# Patient Record
Sex: FEMALE | Race: WHITE | NOT HISPANIC OR LATINO | Employment: FULL TIME | RURAL
[De-identification: names, ages, dates, MRNs, and addresses within clinical notes are randomized per-mention and may not be internally consistent; named-entity substitution may affect disease eponyms.]

---

## 2018-10-26 ENCOUNTER — CONVERSION ENCOUNTER (OUTPATIENT)
Dept: FAMILY MEDICINE CLINIC | Facility: CLINIC | Age: 38
End: 2018-10-26

## 2019-06-12 VITALS
DIASTOLIC BLOOD PRESSURE: 79 MMHG | OXYGEN SATURATION: 99 % | WEIGHT: 201 LBS | BODY MASS INDEX: 35.61 KG/M2 | SYSTOLIC BLOOD PRESSURE: 113 MMHG | HEIGHT: 63 IN | HEART RATE: 81 BPM | RESPIRATION RATE: 18 BRPM

## 2019-07-01 ENCOUNTER — OFFICE VISIT (OUTPATIENT)
Dept: FAMILY MEDICINE CLINIC | Facility: CLINIC | Age: 39
End: 2019-07-01

## 2019-07-01 VITALS
TEMPERATURE: 97.6 F | RESPIRATION RATE: 18 BRPM | BODY MASS INDEX: 35.97 KG/M2 | OXYGEN SATURATION: 98 % | HEART RATE: 67 BPM | DIASTOLIC BLOOD PRESSURE: 77 MMHG | SYSTOLIC BLOOD PRESSURE: 108 MMHG | HEIGHT: 63 IN | WEIGHT: 203 LBS

## 2019-07-01 DIAGNOSIS — F32.A ANXIETY AND DEPRESSION: ICD-10-CM

## 2019-07-01 DIAGNOSIS — F41.9 ANXIETY AND DEPRESSION: ICD-10-CM

## 2019-07-01 DIAGNOSIS — G43.719 INTRACTABLE CHRONIC MIGRAINE WITHOUT AURA AND WITHOUT STATUS MIGRAINOSUS: ICD-10-CM

## 2019-07-01 DIAGNOSIS — Z79.4 TYPE 2 DIABETES MELLITUS WITHOUT COMPLICATION, WITH LONG-TERM CURRENT USE OF INSULIN (HCC): ICD-10-CM

## 2019-07-01 DIAGNOSIS — Z13.220 SCREENING FOR HYPERLIPIDEMIA: ICD-10-CM

## 2019-07-01 DIAGNOSIS — Z00.00 ANNUAL PHYSICAL EXAM: Primary | ICD-10-CM

## 2019-07-01 DIAGNOSIS — R53.82 CHRONIC FATIGUE: ICD-10-CM

## 2019-07-01 DIAGNOSIS — E03.9 HYPOTHYROIDISM, UNSPECIFIED TYPE: ICD-10-CM

## 2019-07-01 DIAGNOSIS — I10 ESSENTIAL HYPERTENSION: ICD-10-CM

## 2019-07-01 DIAGNOSIS — M25.551 RIGHT HIP PAIN: ICD-10-CM

## 2019-07-01 DIAGNOSIS — E11.9 TYPE 2 DIABETES MELLITUS WITHOUT COMPLICATION, WITH LONG-TERM CURRENT USE OF INSULIN (HCC): ICD-10-CM

## 2019-07-01 PROCEDURE — 99214 OFFICE O/P EST MOD 30 MIN: CPT | Performed by: FAMILY MEDICINE

## 2019-07-01 PROCEDURE — 99395 PREV VISIT EST AGE 18-39: CPT | Performed by: FAMILY MEDICINE

## 2019-07-01 RX ORDER — TRAMADOL HYDROCHLORIDE 50 MG/1
50 TABLET ORAL
COMMUNITY
End: 2020-07-02

## 2019-07-01 RX ORDER — TOPIRAMATE 50 MG/1
50 TABLET, FILM COATED ORAL DAILY
Qty: 30 TABLET | Refills: 1 | Status: SHIPPED | OUTPATIENT
Start: 2019-07-01 | End: 2020-03-19 | Stop reason: SDUPTHER

## 2019-07-01 RX ORDER — LEVOTHYROXINE SODIUM 0.12 MG/1
125 TABLET ORAL DAILY
Qty: 30 TABLET | Refills: 3 | Status: SHIPPED | OUTPATIENT
Start: 2019-07-01 | End: 2020-03-19 | Stop reason: SDUPTHER

## 2019-07-01 RX ORDER — AMITRIPTYLINE HYDROCHLORIDE 25 MG/1
TABLET, FILM COATED ORAL
COMMUNITY
Start: 2016-08-09 | End: 2019-07-01 | Stop reason: SDUPTHER

## 2019-07-01 RX ORDER — TOPIRAMATE 50 MG/1
50 TABLET, FILM COATED ORAL DAILY
COMMUNITY
Start: 2016-02-26 | End: 2019-07-01 | Stop reason: SDUPTHER

## 2019-07-01 RX ORDER — SUMATRIPTAN 100 MG/1
TABLET, FILM COATED ORAL
COMMUNITY
Start: 2016-02-26 | End: 2019-07-01 | Stop reason: SDUPTHER

## 2019-07-01 RX ORDER — LEVOTHYROXINE SODIUM 0.12 MG/1
TABLET ORAL
COMMUNITY
Start: 2016-08-09 | End: 2019-07-01 | Stop reason: SDUPTHER

## 2019-07-01 RX ORDER — FLUOXETINE HYDROCHLORIDE 20 MG/1
20 CAPSULE ORAL DAILY
COMMUNITY
End: 2019-07-01 | Stop reason: SDUPTHER

## 2019-07-01 RX ORDER — SUMATRIPTAN 50 MG/1
50 TABLET, FILM COATED ORAL
Qty: 10 TABLET | Refills: 1 | Status: SHIPPED | OUTPATIENT
Start: 2019-07-01 | End: 2020-03-19 | Stop reason: SDUPTHER

## 2019-07-01 RX ORDER — FLUOXETINE HYDROCHLORIDE 20 MG/1
20 CAPSULE ORAL DAILY
Qty: 30 CAPSULE | Refills: 1 | Status: SHIPPED | OUTPATIENT
Start: 2019-07-01 | End: 2020-03-19 | Stop reason: SDUPTHER

## 2019-07-01 RX ORDER — AMITRIPTYLINE HYDROCHLORIDE 25 MG/1
25 TABLET, FILM COATED ORAL NIGHTLY
Qty: 30 TABLET | Refills: 3 | Status: SHIPPED | OUTPATIENT
Start: 2019-07-01 | End: 2020-07-02

## 2019-07-01 RX ORDER — GABAPENTIN 300 MG/1
300 CAPSULE ORAL DAILY
Qty: 30 CAPSULE | Refills: 1 | Status: SHIPPED | OUTPATIENT
Start: 2019-07-01 | End: 2019-07-31

## 2019-07-02 LAB
25(OH)D3+25(OH)D2 SERPL-MCNC: 18 NG/ML (ref 30–100)
ALBUMIN SERPL-MCNC: 3.8 G/DL (ref 3.5–5.5)
ALBUMIN/GLOB SERPL: 1.3 {RATIO} (ref 1.2–2.2)
ALP SERPL-CCNC: 85 IU/L (ref 39–117)
ALT SERPL-CCNC: 10 IU/L (ref 0–32)
AST SERPL-CCNC: 10 IU/L (ref 0–40)
BASOPHILS # BLD AUTO: 0 X10E3/UL (ref 0–0.2)
BASOPHILS NFR BLD AUTO: 0 %
BILIRUB SERPL-MCNC: 0.3 MG/DL (ref 0–1.2)
BUN SERPL-MCNC: 18 MG/DL (ref 6–20)
BUN/CREAT SERPL: 29 (ref 9–23)
CALCIUM SERPL-MCNC: 9.2 MG/DL (ref 8.7–10.2)
CHLORIDE SERPL-SCNC: 103 MMOL/L (ref 96–106)
CHOLEST SERPL-MCNC: 214 MG/DL (ref 100–199)
CHOLEST/HDLC SERPL: 5.1 RATIO (ref 0–4.4)
CO2 SERPL-SCNC: 26 MMOL/L (ref 20–29)
CREAT SERPL-MCNC: 0.63 MG/DL (ref 0.57–1)
EOSINOPHIL # BLD AUTO: 0.3 X10E3/UL (ref 0–0.4)
EOSINOPHIL NFR BLD AUTO: 3 %
ERYTHROCYTE [DISTWIDTH] IN BLOOD BY AUTOMATED COUNT: 13.1 % (ref 12.3–15.4)
GLOBULIN SER CALC-MCNC: 3 G/DL (ref 1.5–4.5)
GLUCOSE SERPL-MCNC: 87 MG/DL (ref 65–99)
HBA1C MFR BLD: 6 % (ref 4.8–5.6)
HCT VFR BLD AUTO: 38.8 % (ref 34–46.6)
HDLC SERPL-MCNC: 42 MG/DL
HGB BLD-MCNC: 12.6 G/DL (ref 11.1–15.9)
IMM GRANULOCYTES # BLD AUTO: 0 X10E3/UL (ref 0–0.1)
IMM GRANULOCYTES NFR BLD AUTO: 0 %
LDLC SERPL CALC-MCNC: 141 MG/DL (ref 0–99)
LYMPHOCYTES # BLD AUTO: 2.6 X10E3/UL (ref 0.7–3.1)
LYMPHOCYTES NFR BLD AUTO: 25 %
MCH RBC QN AUTO: 28.8 PG (ref 26.6–33)
MCHC RBC AUTO-ENTMCNC: 32.5 G/DL (ref 31.5–35.7)
MCV RBC AUTO: 89 FL (ref 79–97)
MONOCYTES # BLD AUTO: 0.4 X10E3/UL (ref 0.1–0.9)
MONOCYTES NFR BLD AUTO: 4 %
NEUTROPHILS # BLD AUTO: 7 X10E3/UL (ref 1.4–7)
NEUTROPHILS NFR BLD AUTO: 68 %
PLATELET # BLD AUTO: 304 X10E3/UL (ref 150–450)
POTASSIUM SERPL-SCNC: 4.3 MMOL/L (ref 3.5–5.2)
PROT SERPL-MCNC: 6.8 G/DL (ref 6–8.5)
RBC # BLD AUTO: 4.38 X10E6/UL (ref 3.77–5.28)
SODIUM SERPL-SCNC: 140 MMOL/L (ref 134–144)
T3 SERPL-MCNC: 108 NG/DL (ref 71–180)
T3FREE SERPL-MCNC: 2.7 PG/ML (ref 2–4.4)
T4 FREE SERPL-MCNC: 0.61 NG/DL (ref 0.82–1.77)
T4 SERPL-MCNC: 4.6 UG/DL (ref 4.5–12)
TRIGL SERPL-MCNC: 157 MG/DL (ref 0–149)
TSH SERPL DL<=0.005 MIU/L-ACNC: 9.84 UIU/ML (ref 0.45–4.5)
VIT B12 SERPL-MCNC: 583 PG/ML (ref 232–1245)
VLDLC SERPL CALC-MCNC: 31 MG/DL (ref 5–40)
WBC # BLD AUTO: 10.4 X10E3/UL (ref 3.4–10.8)

## 2019-07-12 PROBLEM — G43.009 MIGRAINE WITHOUT AURA AND WITHOUT STATUS MIGRAINOSUS, NOT INTRACTABLE: Status: ACTIVE | Noted: 2019-07-12

## 2019-07-12 PROBLEM — R53.83 MALAISE AND FATIGUE: Status: ACTIVE | Noted: 2019-07-12

## 2019-07-12 PROBLEM — D64.9 ANEMIA: Status: ACTIVE | Noted: 2019-07-12

## 2019-07-12 PROBLEM — M25.561 KNEE PAIN, RIGHT: Status: ACTIVE | Noted: 2019-07-12

## 2019-07-12 PROBLEM — E11.9 DIABETES (HCC): Status: ACTIVE | Noted: 2019-07-12

## 2019-07-12 PROBLEM — E03.9 HYPOTHYROID: Status: ACTIVE | Noted: 2019-07-12

## 2019-07-12 PROBLEM — R53.81 MALAISE AND FATIGUE: Status: ACTIVE | Noted: 2019-07-12

## 2019-08-10 PROBLEM — M25.551 RIGHT HIP PAIN: Status: ACTIVE | Noted: 2019-08-10

## 2019-08-10 PROBLEM — I10 ESSENTIAL HYPERTENSION: Status: ACTIVE | Noted: 2019-08-10

## 2019-08-10 PROBLEM — Z00.00 ANNUAL PHYSICAL EXAM: Status: ACTIVE | Noted: 2019-08-10

## 2019-08-11 NOTE — ASSESSMENT & PLAN NOTE
Discussed injury prevention, diet and exercise, safe sexual practices, and screening for common diseases. Encouraged use of sunscreen and seatbelts. Avoidance of tobacco encouraged. Limitation or avoidance of alcohol encouraged. Recommend yearly dental and eye exams. Also discussed monitoring of blood pressure, lipids.

## 2020-03-19 ENCOUNTER — OFFICE VISIT (OUTPATIENT)
Dept: FAMILY MEDICINE CLINIC | Facility: CLINIC | Age: 40
End: 2020-03-19

## 2020-03-19 VITALS
BODY MASS INDEX: 35.93 KG/M2 | SYSTOLIC BLOOD PRESSURE: 100 MMHG | HEIGHT: 63 IN | DIASTOLIC BLOOD PRESSURE: 66 MMHG | TEMPERATURE: 98.4 F | WEIGHT: 202.8 LBS | OXYGEN SATURATION: 95 % | HEART RATE: 118 BPM

## 2020-03-19 DIAGNOSIS — Z79.4 TYPE 2 DIABETES MELLITUS WITHOUT COMPLICATION, WITH LONG-TERM CURRENT USE OF INSULIN (HCC): ICD-10-CM

## 2020-03-19 DIAGNOSIS — E03.9 HYPOTHYROIDISM, UNSPECIFIED TYPE: ICD-10-CM

## 2020-03-19 DIAGNOSIS — R10.11 RIGHT UPPER QUADRANT PAIN: Primary | ICD-10-CM

## 2020-03-19 DIAGNOSIS — F32.A ANXIETY AND DEPRESSION: ICD-10-CM

## 2020-03-19 DIAGNOSIS — F41.9 ANXIETY AND DEPRESSION: ICD-10-CM

## 2020-03-19 DIAGNOSIS — G43.719 INTRACTABLE CHRONIC MIGRAINE WITHOUT AURA AND WITHOUT STATUS MIGRAINOSUS: ICD-10-CM

## 2020-03-19 DIAGNOSIS — E11.9 TYPE 2 DIABETES MELLITUS WITHOUT COMPLICATION, WITH LONG-TERM CURRENT USE OF INSULIN (HCC): ICD-10-CM

## 2020-03-19 PROCEDURE — 99213 OFFICE O/P EST LOW 20 MIN: CPT | Performed by: FAMILY MEDICINE

## 2020-03-19 RX ORDER — SUMATRIPTAN 5 MG/1
SPRAY NASAL
COMMUNITY
End: 2020-03-19 | Stop reason: SDUPTHER

## 2020-03-19 RX ORDER — DICYCLOMINE HCL 20 MG
20 TABLET ORAL EVERY 6 HOURS
Qty: 60 TABLET | Refills: 0 | Status: SHIPPED | OUTPATIENT
Start: 2020-03-19

## 2020-03-19 RX ORDER — ONDANSETRON 4 MG/1
4 TABLET, FILM COATED ORAL EVERY 8 HOURS PRN
Qty: 30 TABLET | Refills: 0 | Status: SHIPPED | OUTPATIENT
Start: 2020-03-19 | End: 2021-03-18 | Stop reason: SDUPTHER

## 2020-03-19 RX ORDER — FLUOXETINE HYDROCHLORIDE 20 MG/1
20 CAPSULE ORAL DAILY
Qty: 30 CAPSULE | Refills: 1 | Status: SHIPPED | OUTPATIENT
Start: 2020-03-19 | End: 2020-06-05 | Stop reason: ALTCHOICE

## 2020-03-19 RX ORDER — TOPIRAMATE 50 MG/1
50 TABLET, FILM COATED ORAL DAILY
Qty: 30 TABLET | Refills: 1 | Status: SHIPPED | OUTPATIENT
Start: 2020-03-19 | End: 2020-07-23

## 2020-03-19 RX ORDER — ONDANSETRON 4 MG/1
TABLET, ORALLY DISINTEGRATING ORAL
COMMUNITY
Start: 2020-03-11 | End: 2020-07-02

## 2020-03-19 RX ORDER — LEVOTHYROXINE SODIUM 0.12 MG/1
125 TABLET ORAL DAILY
Qty: 30 TABLET | Refills: 3 | Status: SHIPPED | OUTPATIENT
Start: 2020-03-19 | End: 2020-07-23

## 2020-03-19 RX ORDER — CHLORCYCLIZINE HYDROCHLORIDE AND PSEUDOEPHEDRINE HYDROCHLORIDE 25; 60 MG/1; MG/1
TABLET ORAL
COMMUNITY
Start: 2020-03-11 | End: 2020-06-01

## 2020-03-19 RX ORDER — SUMATRIPTAN 50 MG/1
50 TABLET, FILM COATED ORAL
Qty: 10 TABLET | Refills: 1 | Status: SHIPPED | OUTPATIENT
Start: 2020-03-19 | End: 2020-10-22 | Stop reason: SDUPTHER

## 2020-03-19 NOTE — ASSESSMENT & PLAN NOTE
Based on exam and positive Marley's test, patient likely has cholecystitis.  Due to the recent coronavirus pandemic all elective surgeries have been halted at this time.  Once the pandemic is over she was referred to general surgery for further evaluation and treatment.

## 2020-03-19 NOTE — PROGRESS NOTES
Subjective  Answers for HPI/ROS submitted by the patient on 3/16/2020   What is the primary reason for your visit?: Other  Please describe your symptoms.: Nausea, stomach cramps at the top of the belly near the breast bone. On and off again Fever.and belching.  Have you had these symptoms before?: No  How long have you been having these symptoms?: 1-4 weeks ago  Please list any medications you are currently taking for this condition.: some over the counter chewable antacids. Nausea medication    Roseanna Charles is a 39 y.o. female.     Chief Complaint   Patient presents with   • Heartburn   • Vomiting   • Nausea       Heartburn   She complains of belching, heartburn and nausea. She reports no coughing. This is a new (heartburn that started 2 weeks ago does not happen all the time but does happen with gressy foods patient stats she gets pain in the center of her chest that occasionlly travles up to her throut ) problem. The current episode started 1 to 4 weeks ago. The problem occurs frequently. The problem has been gradually worsening. The heartburn duration is several minutes (30). The heartburn is located in the substernum. The heartburn is of severe intensity. The heartburn wakes her from sleep. The heartburn limits her activity. The heartburn doesn't change with position. The symptoms are aggravated by certain foods and stress (patient is under alot of stress right now due to family issues ). Pertinent negatives include no fatigue or muscle weakness. There are no known risk factors. She has tried an antacid (patient does not recall the name if the medication she tried it was a chewable tablet that helpped only brifly ) for the symptoms. The treatment provided no relief.   Vomiting    This is a new problem. The current episode started in the past 7 days (monday and tuesday of this week patient was throwing up for 20 min episodes ). The problem occurs 5 to 10 times per day. The problem has been gradually  improving. The emesis has an appearance of stomach contents. There has been no fever. Associated symptoms include headaches. Pertinent negatives include no coughing, dizziness or fever. She has tried increased fluids for the symptoms. The treatment provided moderate relief.   Nausea   This is a new (patient has not been wanting to eat much due to her feeling nauses patient has not taken any thing for the nausea but stats she feeling like it is getting better ) problem. The current episode started in the past 7 days. The problem occurs daily. The problem has been unchanged. Associated symptoms include headaches, nausea and vomiting. Pertinent negatives include no coughing, fatigue or fever. Nothing aggravates the symptoms. She has tried lying down for the symptoms. The treatment provided no relief.   Fever    This is a new problem. The current episode started in the past 7 days (fever started a week ago today and ended tuesday of this week ). The problem occurs 2 to 4 times per day. The problem has been resolved. The maximum temperature noted was 99 to 99.9 F. The temperature was taken using an oral thermometer. Associated symptoms include headaches, nausea and vomiting. Pertinent negatives include no coughing.        The following portions of the patient's history were reviewed and updated as appropriate: allergies, current medications, past family history, past medical history, past social history, past surgical history and problem list.    Allergies:  No Known Allergies    Social History:  Social History     Socioeconomic History   • Marital status:      Spouse name: Not on file   • Number of children: Not on file   • Years of education: Not on file   • Highest education level: Not on file   Tobacco Use   • Smoking status: Never Smoker   • Smokeless tobacco: Never Used   Substance and Sexual Activity   • Alcohol use: Yes     Comment: Occasional   • Drug use: No   • Sexual activity: Not Currently      Partners: Female     Comment:        Family History:  Family History   Problem Relation Age of Onset   • Hyperlipidemia Mother    • Diabetes Mother         Diabetes Mellitus   • Coronary artery disease Father    • Hypertension Father    • Diabetes Father         Diabetes Mellitus   • COPD Son    • Coronary artery disease Paternal Grandmother    • Hypertension Paternal Grandmother    • Diabetes Paternal Grandmother         Diabetes Mellitus   • COPD Paternal Grandmother        Past Medical History :  Patient Active Problem List   Diagnosis   • Anxiety and depression   • Migraine   • Obstructive sleep apnea   • Anemia   • Diabetes (CMS/HCC)   • Hypothyroid   • Knee pain, right   • Malaise and fatigue   • Migraine without aura and without status migrainosus, not intractable   • Annual physical exam   • Essential hypertension   • Right hip pain   • Right upper quadrant pain       Medication List:  Outpatient Encounter Medications as of 3/19/2020   Medication Sig Dispense Refill   • amitriptyline (ELAVIL) 25 MG tablet Take 1 tablet by mouth Every Night. 30 tablet 3   • diclofenac (VOLTAREN) 50 MG EC tablet Take 1 tablet by mouth 2 (Two) Times a Day As Needed (pain). 60 tablet 1   • FLUoxetine (PROzac) 20 MG capsule Take 1 capsule by mouth Daily. 30 capsule 1   • levothyroxine (SYNTHROID, LEVOTHROID) 125 MCG tablet Take 1 tablet by mouth Daily. 30 tablet 3   • metFORMIN (GLUCOPHAGE) 500 MG tablet Take 1 tablet by mouth 2 (Two) Times a Day With Meals. 60 tablet 3   • ondansetron ODT (ZOFRAN-ODT) 4 MG disintegrating tablet DISSOLVE 1 TABLET IN MOUTH EVERY 6 HOURS AS NEEDED FOR NAUSEA     • STAHIST AD 25-60 MG tablet TAKE 1 TABLET BY MOUTH EVERY 8 HOURS AS NEEDED FOR CONGESTION FOR 30 DAYS     • SUMAtriptan (IMITREX) 50 MG tablet Take 1 tablet by mouth Every 2 (Two) Hours As Needed for Migraine (Max is 200mg a day.). 1 tab at onset of headache. Repeat 1time in 2 hrs 10 tablet 1   • topiramate (Topamax) 50 MG tablet  "Take 1 tablet by mouth Daily. 30 tablet 1   • traMADol (ULTRAM) 50 MG tablet Take 50 mg by mouth.     • [DISCONTINUED] FLUoxetine (PROzac) 20 MG capsule Take 1 capsule by mouth Daily. 30 capsule 1   • [DISCONTINUED] levothyroxine (SYNTHROID, LEVOTHROID) 125 MCG tablet Take 1 tablet by mouth Daily. 30 tablet 3   • [DISCONTINUED] metFORMIN (GLUCOPHAGE) 500 MG tablet Take 1 tablet by mouth 2 (Two) Times a Day With Meals. 60 tablet 3   • [DISCONTINUED] SUMAtriptan (IMITREX) 50 MG tablet Take 1 tablet by mouth Every 2 (Two) Hours As Needed for Migraine (Max is 200mg a day.). 1 tab at onset of headache. Repeat 1time in 2 hrs 10 tablet 1   • [DISCONTINUED] topiramate (TOPAMAX) 50 MG tablet Take 1 tablet by mouth Daily. 30 tablet 1   • dicyclomine (Bentyl) 20 MG tablet Take 1 tablet by mouth Every 6 (Six) Hours. 60 tablet 0   • ondansetron (ZOFRAN) 4 MG tablet Take 1 tablet by mouth Every 8 (Eight) Hours As Needed for Nausea or Vomiting. 30 tablet 0   • [DISCONTINUED] SUMAtriptan (IMITREX) 5 MG/ACT nasal spray        No facility-administered encounter medications on file as of 3/19/2020.        Past Surgical History:  Past Surgical History:   Procedure Laterality Date   •  SECTION      x2   • CYSTECTOMY      total       Review of Systems:  Review of Systems   Constitutional: Negative for fatigue and fever.   Respiratory: Negative for cough.    Gastrointestinal: Positive for nausea and vomiting.   Musculoskeletal: Negative for muscle weakness.   Neurological: Negative for dizziness.       I have reviewed and confirmed the accuracy of the ROS as documented by the MA/LPN/RN Doug Recinos Sr, MD    Vital Signs:  Visit Vitals  /66   Pulse 118   Temp 98.4 °F (36.9 °C) (Oral)   Ht 160 cm (63\")   Wt 92 kg (202 lb 12.8 oz)   SpO2 95%   BMI 35.92 kg/m²       Physical Exam   Constitutional: She is oriented to person, place, and time. She appears well-developed and well-nourished.   HENT:   Head: Normocephalic.   Right " Ear: External ear normal.   Left Ear: External ear normal.   Nose: Nose normal.   Eyes: Conjunctivae are normal.   Neck: Normal range of motion. Neck supple.   Cardiovascular: Normal rate and regular rhythm.   Pulmonary/Chest: Effort normal and breath sounds normal.   Abdominal: There is tenderness in the right upper quadrant. There is positive Polanco's sign.   Musculoskeletal: Normal range of motion.   Neurological: She is alert and oriented to person, place, and time.   Skin: Skin is warm and dry. Capillary refill takes less than 2 seconds.   Vitals reviewed.      Assessment and Plan:  Diagnoses and all orders for this visit:    1. Right upper quadrant pain (Primary)  Assessment & Plan:  Based on exam and positive Marley's test, patient likely has cholecystitis.  Due to the recent coronavirus pandemic all elective surgeries have been halted at this time.  Once the pandemic is over she was referred to general surgery for further evaluation and treatment.    Orders:  -     ondansetron (ZOFRAN) 4 MG tablet; Take 1 tablet by mouth Every 8 (Eight) Hours As Needed for Nausea or Vomiting.  Dispense: 30 tablet; Refill: 0  -     dicyclomine (Bentyl) 20 MG tablet; Take 1 tablet by mouth Every 6 (Six) Hours.  Dispense: 60 tablet; Refill: 0    2. Type 2 diabetes mellitus without complication, with long-term current use of insulin (CMS/[facility])  -     metFORMIN (GLUCOPHAGE) 500 MG tablet; Take 1 tablet by mouth 2 (Two) Times a Day With Meals.  Dispense: 60 tablet; Refill: 3    3. Intractable chronic migraine without aura and without status migrainosus  -     SUMAtriptan (IMITREX) 50 MG tablet; Take 1 tablet by mouth Every 2 (Two) Hours As Needed for Migraine (Max is 200mg a day.). 1 tab at onset of headache. Repeat 1time in 2 hrs  Dispense: 10 tablet; Refill: 1  -     topiramate (Topamax) 50 MG tablet; Take 1 tablet by mouth Daily.  Dispense: 30 tablet; Refill: 1    4. Anxiety and depression  -     FLUoxetine (PROzac) 20 MG  capsule; Take 1 capsule by mouth Daily.  Dispense: 30 capsule; Refill: 1    5. Hypothyroidism, unspecified type  -     levothyroxine (SYNTHROID, LEVOTHROID) 125 MCG tablet; Take 1 tablet by mouth Daily.  Dispense: 30 tablet; Refill: 3        An After Visit Summary and PPPS were given to the patient.

## 2020-05-06 ENCOUNTER — NURSE TRIAGE (OUTPATIENT)
Dept: CALL CENTER | Facility: HOSPITAL | Age: 40
End: 2020-05-06

## 2020-05-06 NOTE — TELEPHONE ENCOUNTER
She had Evisit and answered questions at [facility] Dr. Recinos office and was told to be tested for COVID 19,at [facility]  She was wondering what to do. Triage nurse gave her the number for [facility]gent [facility]  [address], [phone] to call and see what she needs to do. She needs work excuse to please through [employer].     Reason for Disposition  • 1] COVID-19 infection diagnosed or suspected AND [2] mild symptoms (fever, cough) AND [2] no trouble breathing or other complications    Additional Information  • Negative: SEVERE difficulty breathing (e.g., struggling for each breath, speaks in single words)  • Negative: Difficult to awaken or acting confused (e.g., disoriented, slurred speech)  • Negative: Bluish (or gray) lips or face now  • Negative: Shock suspected (e.g., cold/pale/clammy skin, too weak to stand, low BP, rapid pulse)  • Negative: Sounds like a life-threatening emergency to the triager  • Negative: [1] COVID-19 suspected (e.g., cough, fever, shortness of breath) AND [2] public health department recommends testing  • Negative: [1] COVID-19 exposure AND [2] no symptoms  • Negative: COVID-19 and Breastfeeding, questions about  • Negative: SEVERE or constant chest pain (Exception: mild central chest pain, present only when coughing)  • Negative: MODERATE difficulty breathing (e.g., speaks in phrases, SOB even at rest, pulse 100-120)  • Negative: Patient sounds very sick or weak to the triager  • Negative: MILD difficulty breathing (e.g., minimal/no SOB at rest, SOB with walking, pulse <100)  • Negative: Chest pain  • Negative: Fever > 103 F (39.4 C)  • Negative: [1] Fever > 101 F (38.3 C) AND [2] age > 60  • Negative: [1] Fever > 100.0 F (37.8 C) AND [2] bedridden (e.g., nursing home patient, CVA, chronic illness, recovering from surgery)  • Negative: HIGH RISK patient (e.g., age > 64 years, diabetes, heart or lung disease, weak immune system)  • Negative: Fever present > 3 days  "(72 hours)  • Negative: [1] Fever returns after gone for over 24 hours AND [2] symptoms worse or not improved  • Negative: [1] Continuous (nonstop) coughing interferes with work or school AND [2] no improvement using cough treatment per protocol  • Negative: Cough present > 3 weeks  • Negative: COVID-19, questions about    Answer Assessment - Initial Assessment Questions  1. COVID-19 DIAGNOSIS: \"Who made your Coronavirus (COVID-19) diagnosis?\" \"Was it confirmed by a positive lab test?\" If not diagnosed by a HCP, ask \"Are there lots of cases (community spread) where you live?\" (See public health department website, if unsure)    * MAJOR community spread: high number of cases; numbers of cases are increasing; many people hospitalized.    * MINOR community spread: low number of cases; not increasing; few or no people hospitalized      minor  2. ONSET: \"When did the COVID-19 symptoms start?\"       3 days  3. WORST SYMPTOM: \"What is your worst symptom?\" (e.g., cough, fever, shortness of breath, muscle aches)      Headache and aching and feeling bad  4. COUGH: \"How bad is the cough?\"        Cough comes and goes  5. FEVER: \"Do you have a fever?\" If so, ask: \"What is your temperature, how was it measured, and when did it start?\"      101  6. RESPIRATORY STATUS: \"Describe your breathing?\" (e.g., shortness of breath, wheezing, unable to speak)       Sob at times, not comfortable to breathe, no wheezing  7. BETTER-SAME-WORSE: \"Are you getting better, staying the same or getting worse compared to yesterday?\"  If getting worse, ask, \"In what way?\"      worse today  8. HIGH RISK DISEASE: \"Do you have any chronic medical problems?\" (e.g., asthma, heart or lung disease, weak immune system, etc.)      Diabetic, thyroid disease  9. PREGNANCY: \"Is there any chance you are pregnant?\" \"When was your last menstrual period?\"      no  10. OTHER SYMPTOMS: \"Do you have any other symptoms?\"  (e.g., runny nose, headache, sore throat, loss of " smell)        Headache, sob, throat scratchy    Protocols used: CORONAVIRUS (COVID-19) DIAGNOSED OR SUSPECTED-ADULT-

## 2020-05-07 ENCOUNTER — TELEPHONE (OUTPATIENT)
Dept: FAMILY MEDICINE CLINIC | Facility: CLINIC | Age: 40
End: 2020-05-07

## 2020-05-08 PROCEDURE — U0003 INFECTIOUS AGENT DETECTION BY NUCLEIC ACID (DNA OR RNA); SEVERE ACUTE RESPIRATORY SYNDROME CORONAVIRUS 2 (SARS-COV-2) (CORONAVIRUS DISEASE [COVID-19]), AMPLIFIED PROBE TECHNIQUE, MAKING USE OF HIGH THROUGHPUT TECHNOLOGIES AS DESCRIBED BY CMS-2020-01-R: HCPCS | Performed by: NURSE PRACTITIONER

## 2020-05-13 ENCOUNTER — TELEPHONE (OUTPATIENT)
Dept: URGENT CARE | Facility: CLINIC | Age: 40
End: 2020-05-13

## 2020-06-01 ENCOUNTER — TELEMEDICINE (OUTPATIENT)
Dept: FAMILY MEDICINE CLINIC | Facility: CLINIC | Age: 40
End: 2020-06-01

## 2020-06-01 DIAGNOSIS — J98.8 BACTERIAL RESPIRATORY INFECTION: Primary | ICD-10-CM

## 2020-06-01 DIAGNOSIS — B96.89 BACTERIAL RESPIRATORY INFECTION: Primary | ICD-10-CM

## 2020-06-01 PROCEDURE — 99213 OFFICE O/P EST LOW 20 MIN: CPT | Performed by: FAMILY MEDICINE

## 2020-06-01 RX ORDER — CIPROFLOXACIN 500 MG/1
500 TABLET, FILM COATED ORAL 2 TIMES DAILY
Qty: 20 TABLET | Refills: 0 | Status: SHIPPED | OUTPATIENT
Start: 2020-06-01 | End: 2020-06-11

## 2020-06-01 RX ORDER — PREDNISONE 20 MG/1
40 TABLET ORAL DAILY
Qty: 10 TABLET | Refills: 0 | Status: SHIPPED | OUTPATIENT
Start: 2020-06-01 | End: 2020-06-06

## 2020-06-01 RX ORDER — GUAIFENESIN AND CODEINE PHOSPHATE 100; 10 MG/5ML; MG/5ML
5 SOLUTION ORAL 3 TIMES DAILY PRN
Qty: 180 ML | Refills: 0 | Status: SHIPPED | OUTPATIENT
Start: 2020-06-01 | End: 2020-06-11

## 2020-06-01 NOTE — PROGRESS NOTES
Chief Complaint   Patient presents with   • URI     COVID 19 symptoms      Video Visit    History of Present Illness:  Subjective   Roseanna Charles is a 39 y.o. female.   URI    This is a new problem. The current episode started in the past 7 days (started 3 days ago ). The problem has been rapidly worsening. The maximum temperature recorded prior to her arrival was 101 - 101.9 F (She has 101.3 to low 100.0). Associated symptoms include abdominal pain, congestion, coughing, diarrhea and headaches. Pertinent negatives include no chest pain, dysuria, ear pain, joint pain, joint swelling, nausea, neck pain, plugged ear sensation, rash, rhinorrhea, sinus pain, sneezing, sore throat, swollen glands, vomiting or wheezing. Associated symptoms comments: Patient states that she has been feeling really off and not well for the last 3 days or so. She states that she has been having fevers,SOB, Cough, Fatigued and hurting all over. She states that she has not been exposed to her knowledge to someone with COVID-19. She states that she has been staying at home. She state that she has been trying to stay away from people. She states that she just is not feeling well. . She has tried nothing for the symptoms. The treatment provided no relief.        Allergies:  No Known Allergies    Social History:  Social History     Socioeconomic History   • Marital status:      Spouse name: Not on file   • Number of children: Not on file   • Years of education: Not on file   • Highest education level: Not on file   Tobacco Use   • Smoking status: Never Smoker   • Smokeless tobacco: Never Used   Substance and Sexual Activity   • Alcohol use: Yes     Comment: Occasional   • Drug use: No   • Sexual activity: Not Currently     Partners: Female     Comment:        Family History:  Family History   Problem Relation Age of Onset   • Hyperlipidemia Mother    • Diabetes Mother         Diabetes Mellitus   • Coronary artery disease Father     • Hypertension Father    • Diabetes Father         Diabetes Mellitus   • COPD Son    • Coronary artery disease Paternal Grandmother    • Hypertension Paternal Grandmother    • Diabetes Paternal Grandmother         Diabetes Mellitus   • COPD Paternal Grandmother        Past Medical History :  Active Ambulatory Problems     Diagnosis Date Noted   • Anxiety and depression 07/01/2019   • Migraine 08/09/2016   • Obstructive sleep apnea 08/09/2016   • Anemia 07/12/2019   • Diabetes (CMS/HCC) 07/12/2019   • Hypothyroid 07/12/2019   • Knee pain, right 07/12/2019   • Malaise and fatigue 07/12/2019   • Migraine without aura and without status migrainosus, not intractable 07/12/2019   • Annual physical exam 08/10/2019   • Essential hypertension 08/10/2019   • Right hip pain 08/10/2019   • Right upper quadrant pain 03/19/2020   • Bacterial respiratory infection 06/01/2020     Resolved Ambulatory Problems     Diagnosis Date Noted   • No Resolved Ambulatory Problems     Past Medical History:   Diagnosis Date   • Abdominal pain    • Acute non-recurrent maxillary sinusitis    • Bacterial vaginosis    • Chicken pox    • Flu-like symptoms    • History of ulcer disease    • Overweight (BMI 25.0-29.9)    • Upper respiratory infection, acute        Medication List:  Outpatient Encounter Medications as of 6/1/2020   Medication Sig Dispense Refill   • amitriptyline (ELAVIL) 25 MG tablet Take 1 tablet by mouth Every Night. 30 tablet 3   • diclofenac (VOLTAREN) 50 MG EC tablet Take 1 tablet by mouth 2 (Two) Times a Day As Needed (pain). 60 tablet 1   • dicyclomine (Bentyl) 20 MG tablet Take 1 tablet by mouth Every 6 (Six) Hours. 60 tablet 0   • FLUoxetine (PROzac) 20 MG capsule Take 1 capsule by mouth Daily. 30 capsule 1   • levothyroxine (SYNTHROID, LEVOTHROID) 125 MCG tablet Take 1 tablet by mouth Daily. 30 tablet 3   • metFORMIN (GLUCOPHAGE) 500 MG tablet Take 1 tablet by mouth 2 (Two) Times a Day With Meals. 60 tablet 3   •  ondansetron (ZOFRAN) 4 MG tablet Take 1 tablet by mouth Every 8 (Eight) Hours As Needed for Nausea or Vomiting. 30 tablet 0   • ondansetron ODT (ZOFRAN-ODT) 4 MG disintegrating tablet DISSOLVE 1 TABLET IN MOUTH EVERY 6 HOURS AS NEEDED FOR NAUSEA     • SUMAtriptan (IMITREX) 50 MG tablet Take 1 tablet by mouth Every 2 (Two) Hours As Needed for Migraine (Max is 200mg a day.). 1 tab at onset of headache. Repeat 1time in 2 hrs 10 tablet 1   • topiramate (Topamax) 50 MG tablet Take 1 tablet by mouth Daily. 30 tablet 1   • traMADol (ULTRAM) 50 MG tablet Take 50 mg by mouth.     • [DISCONTINUED] STAHIST AD 25-60 MG tablet TAKE 1 TABLET BY MOUTH EVERY 8 HOURS AS NEEDED FOR CONGESTION FOR 30 DAYS     • ciprofloxacin (CIPRO) 500 MG tablet Take 1 tablet by mouth 2 (Two) Times a Day for 10 days. 20 tablet 0   • guaiFENesin-codeine (GUAIFENESIN AC) 100-10 MG/5ML liquid Take 5 mL by mouth 3 (Three) Times a Day As Needed for Cough for up to 10 days. 180 mL 0   • predniSONE (DELTASONE) 20 MG tablet Take 2 tablets by mouth Daily for 5 days. 10 tablet 0     No facility-administered encounter medications on file as of 2020.        Past Surgical History:  Past Surgical History:   Procedure Laterality Date   •  SECTION      x2   • CYSTECTOMY      total   • LASER ABLATION          The following portions of the patient's history were reviewed and updated as appropriate: allergies, current medications, past family history, past medical history, past social history, past surgical history and problem list.    Review Of Systems:  Review of Systems   Constitutional: Negative for activity change, appetite change and fatigue.   HENT: Positive for congestion. Negative for ear discharge, ear pain, postnasal drip, rhinorrhea, sneezing, sore throat and swollen glands.    Eyes: Negative for double vision and discharge.   Respiratory: Positive for cough. Negative for chest tightness, shortness of breath and wheezing.    Cardiovascular:  Negative for chest pain.   Gastrointestinal: Positive for abdominal pain and diarrhea. Negative for nausea and vomiting.   Endocrine: Negative for cold intolerance and heat intolerance.   Genitourinary: Negative for dysuria.   Musculoskeletal: Negative for back pain, gait problem, joint pain, joint swelling and neck pain.   Skin: Negative for color change and rash.   Neurological: Negative for dizziness, facial asymmetry and confusion.   Psychiatric/Behavioral: Negative for behavioral problems.       Objective     Physical Exam:  Vital Signs:  There were no vitals taken for this visit.    Physical Exam    Assessment/Plan   Assessment and Plan:  Diagnoses and all orders for this visit:    1. Bacterial respiratory infection (Primary)  Assessment & Plan:  He was prescribed Augmentin and Cheratussin to treat his symptoms.    Increase fluids. Tylenol/motrin for pain or fever.   Medication and medication adverse effects discussed.    Follow-up 5-7 days for reevaluation if not improved or sooner if needed.  This respiratory infection could possibly be a influenza but it is outside the window of treatment for the flu.    Orders:  -     ciprofloxacin (CIPRO) 500 MG tablet; Take 1 tablet by mouth 2 (Two) Times a Day for 10 days.  Dispense: 20 tablet; Refill: 0  -     predniSONE (DELTASONE) 20 MG tablet; Take 2 tablets by mouth Daily for 5 days.  Dispense: 10 tablet; Refill: 0  -     guaiFENesin-codeine (GUAIFENESIN AC) 100-10 MG/5ML liquid; Take 5 mL by mouth 3 (Three) Times a Day As Needed for Cough for up to 10 days.  Dispense: 180 mL; Refill: 0  The use of a video visit has been reviewed with the patient and verbal informed consent has been obtained.         Spent 10 minutes with patient and greater than 50% of visit spent on counseling and coordination of care regarding the patient's illness, along with the pros and cons of various treatment options.

## 2020-06-02 ENCOUNTER — PATIENT MESSAGE (OUTPATIENT)
Dept: FAMILY MEDICINE CLINIC | Facility: CLINIC | Age: 40
End: 2020-06-02

## 2020-06-03 NOTE — TELEPHONE ENCOUNTER
From: Roseanna Charles  Sent: 6/3/2020 3:38 PM EDT  To: Silvana Ernandez LPN  Subject: RE: Visit Follow-Up Question    I am sorry they never gave me a direct phone number to my  or a fax number. All I can do is upload stuff.    ----- Message -----  From: RIVKA TREJO  Sent: 6/3/20 2:37 PM  To: Roseanna Charles  Subject: RE: Visit Follow-Up Question    So I just need to send a work note to them stating you will be back on 6/14/20?     I need a fax number or a phone number so I can call and get the fax number.     -Silvana    ----- Message -----   From: Roseanna Charles   Sent: 6/3/2020 2:30 PM EDT   To: RIVKA TREJO  Subject: RE: Visit Follow-Up Question    I think so. I am trying to get as much rest as I can and drinking as much fluids as possible.    ----- Message -----  From: RIVKA TREJO  Sent: 6/3/20 2:21 PM  To: Roseanna Charles  Subject: RE: Visit Follow-Up Question    Do you feel like you will be able to return on the 14th      ----- Message -----   From: Roseanna Charles   Sent: 6/3/2020 1:50 PM EDT   To: RIVKA TREJO  Subject: RE: Visit Follow-Up Question    I feel like crap honestly. I am wore out. Still running low grade temps. Hurting all over. I have it set up to return on the 14th.    ----- Message -----  From: RIVKA TREJO  Sent: 6/3/20 12:11 PM  To: Roseanna Charles  Subject: RE: Visit Follow-Up Question    When are you planning to return to work? How are you feeling???       -Silvana    ----- Message -----   From: Roseanna Charles   Sent: 6/2/2020 12:39 PM EDT   To: Doug Recinos Sr, MD  Subject: Visit Follow-Up Question    Dr. Recinos,     My work is asking for my return to work letter. They tentatively have me off until June 14th. The letter needs to run from May 31 until when you decide if possible please. Thank you for your time!      Roseanna Charles

## 2020-06-05 DIAGNOSIS — F32.A ANXIETY AND DEPRESSION: Primary | ICD-10-CM

## 2020-06-05 DIAGNOSIS — F41.9 ANXIETY AND DEPRESSION: Primary | ICD-10-CM

## 2020-06-05 RX ORDER — CITALOPRAM 20 MG/1
20 TABLET ORAL DAILY
Qty: 30 TABLET | Refills: 2 | Status: SHIPPED | OUTPATIENT
Start: 2020-06-05 | End: 2020-09-23

## 2020-07-02 ENCOUNTER — OFFICE VISIT (OUTPATIENT)
Dept: FAMILY MEDICINE CLINIC | Facility: CLINIC | Age: 40
End: 2020-07-02

## 2020-07-02 VITALS
HEIGHT: 64 IN | OXYGEN SATURATION: 98 % | WEIGHT: 199 LBS | HEART RATE: 62 BPM | TEMPERATURE: 98 F | SYSTOLIC BLOOD PRESSURE: 96 MMHG | DIASTOLIC BLOOD PRESSURE: 64 MMHG | BODY MASS INDEX: 33.97 KG/M2 | RESPIRATION RATE: 18 BRPM

## 2020-07-02 DIAGNOSIS — F31.62 BIPOLAR DISORDER, CURRENT EPISODE MIXED, MODERATE (HCC): Primary | ICD-10-CM

## 2020-07-02 PROCEDURE — 99213 OFFICE O/P EST LOW 20 MIN: CPT | Performed by: FAMILY MEDICINE

## 2020-07-02 RX ORDER — QUETIAPINE FUMARATE 50 MG/1
25 TABLET, FILM COATED ORAL NIGHTLY
Qty: 30 TABLET | Refills: 1 | Status: SHIPPED | OUTPATIENT
Start: 2020-07-02 | End: 2021-02-09

## 2020-07-02 NOTE — PROGRESS NOTES
Chief Complaint   Patient presents with   • Manic Behavior     Bipolar 3       History of Present Illness:  Subjective   Roseanna Charles is a 39 y.o. female.   History of Present Illness   Bipolar 3 Disorder:   Patient has been seeing her therapist (Rodney Mckoy) and they have been working closely to help her with her Bi-polar disorder. He is requesting that she be tried on Seroquel for her depression, anxiety and insomnia r/t to her Bi-Polar disorder. They (patient and therapist) feel that if she was able to get adequate sleep- she would start feeling better and be able to get her disorder under better control. She is currently only on Celexa 20mg daily.   Patient also has a vitamin D deficiency and she has been counseled on restarting a vitamin D3 supplement (4,000-5,000 iu/day).     Allergies:  No Known Allergies    Social History:  Social History     Socioeconomic History   • Marital status:      Spouse name: Not on file   • Number of children: Not on file   • Years of education: Not on file   • Highest education level: Not on file   Tobacco Use   • Smoking status: Never Smoker   • Smokeless tobacco: Never Used   Substance and Sexual Activity   • Alcohol use: Yes     Comment: Occasional   • Drug use: No   • Sexual activity: Not Currently     Partners: Female     Comment:        Family History:  Family History   Problem Relation Age of Onset   • Hyperlipidemia Mother    • Diabetes Mother         Diabetes Mellitus   • Coronary artery disease Father    • Hypertension Father    • Diabetes Father         Diabetes Mellitus   • COPD Son    • Coronary artery disease Paternal Grandmother    • Hypertension Paternal Grandmother    • Diabetes Paternal Grandmother         Diabetes Mellitus   • COPD Paternal Grandmother        Past Medical History :  Active Ambulatory Problems     Diagnosis Date Noted   • Anxiety and depression 07/01/2019   • Migraine 08/09/2016   • Obstructive sleep apnea 08/09/2016   •  Anemia 07/12/2019   • Diabetes (CMS/[facility]) 07/12/2019   • Hypothyroid 07/12/2019   • Knee pain, right 07/12/2019   • Malaise and fatigue 07/12/2019   • Migraine without aura and without status migrainosus, not intractable 07/12/2019   • Annual physical exam 08/10/2019   • Essential hypertension 08/10/2019   • Right hip pain 08/10/2019   • Right upper quadrant pain 03/19/2020   • Bacterial respiratory infection 06/01/2020   • Bipolar disorder, current episode mixed, moderate (CMS/[facility]) 07/02/2020     Resolved Ambulatory Problems     Diagnosis Date Noted   • No Resolved Ambulatory Problems     Past Medical History:   Diagnosis Date   • Abdominal pain    • Acute non-recurrent maxillary sinusitis    • Bacterial vaginosis    • Chicken pox    • Flu-like symptoms    • History of ulcer disease    • Overweight (BMI 25.0-29.9)    • Upper respiratory infection, acute        Medication List:  Outpatient Encounter Medications as of 7/2/2020   Medication Sig Dispense Refill   • citalopram (CeleXA) 20 MG tablet Take 1 tablet by mouth Daily. 30 tablet 2   • diclofenac (VOLTAREN) 50 MG EC tablet Take 1 tablet by mouth 2 (Two) Times a Day As Needed (pain). 60 tablet 1   • levothyroxine (SYNTHROID, LEVOTHROID) 125 MCG tablet Take 1 tablet by mouth Daily. 30 tablet 3   • metFORMIN (GLUCOPHAGE) 500 MG tablet Take 1 tablet by mouth 2 (Two) Times a Day With Meals. 60 tablet 3   • ondansetron (ZOFRAN) 4 MG tablet Take 1 tablet by mouth Every 8 (Eight) Hours As Needed for Nausea or Vomiting. 30 tablet 0   • SUMAtriptan (IMITREX) 50 MG tablet Take 1 tablet by mouth Every 2 (Two) Hours As Needed for Migraine (Max is 200mg a day.). 1 tab at onset of headache. Repeat 1time in 2 hrs 10 tablet 1   • topiramate (Topamax) 50 MG tablet Take 1 tablet by mouth Daily. 30 tablet 1   • dicyclomine (Bentyl) 20 MG tablet Take 1 tablet by mouth Every 6 (Six) Hours. 60 tablet 0   • QUEtiapine (SEROquel) 50 MG tablet Take 0.5 tablets by mouth Every Night. 30  "tablet 1   • [DISCONTINUED] amitriptyline (ELAVIL) 25 MG tablet Take 1 tablet by mouth Every Night. 30 tablet 3   • [DISCONTINUED] ondansetron ODT (ZOFRAN-ODT) 4 MG disintegrating tablet DISSOLVE 1 TABLET IN MOUTH EVERY 6 HOURS AS NEEDED FOR NAUSEA     • [DISCONTINUED] traMADol (ULTRAM) 50 MG tablet Take 50 mg by mouth.       No facility-administered encounter medications on file as of 2020.        Past Surgical History:  Past Surgical History:   Procedure Laterality Date   •  SECTION      x2   • CYSTECTOMY      total   • LASER ABLATION          The following portions of the patient's history were reviewed and updated as appropriate: allergies, current medications, past family history, past medical history, past social history, past surgical history and problem list.    Review Of Systems:  Review of Systems   Constitutional: Negative for activity change, appetite change and fatigue.   HENT: Negative for ear discharge, ear pain, postnasal drip and rhinorrhea.    Eyes: Negative for double vision and discharge.   Respiratory: Negative for cough, chest tightness and shortness of breath.    Cardiovascular: Negative for chest pain.   Endocrine: Negative for cold intolerance and heat intolerance.   Musculoskeletal: Negative for back pain, gait problem and joint swelling.   Skin: Negative for color change and rash.   Neurological: Negative for dizziness, facial asymmetry and confusion.   Psychiatric/Behavioral: Negative for behavioral problems.        Bipolar symptoms.  Manic       Objective     Physical Exam:  Vital Signs:  Visit Vitals  BP 96/64   Pulse 62   Temp 98 °F (36.7 °C)   Resp 18   Ht 162.6 cm (64\")   Wt 90.3 kg (199 lb)   SpO2 98%   BMI 34.16 kg/m²       Physical Exam   Constitutional: She is oriented to person, place, and time. She appears well-developed and well-nourished.   HENT:   Head: Normocephalic.   Right Ear: External ear normal.   Left Ear: External ear normal.   Nose: Nose normal.   Eyes: " Conjunctivae are normal.   Neck: Normal range of motion. Neck supple.   Cardiovascular: Normal rate and regular rhythm.   Pulmonary/Chest: Effort normal and breath sounds normal.   Musculoskeletal: Normal range of motion.   Neurological: She is alert and oriented to person, place, and time.   Skin: Skin is warm and dry. Capillary refill takes less than 2 seconds.   Psychiatric: She has a normal mood and affect. Her behavior is normal. Judgment and thought content normal.   Vitals reviewed.      Assessment/Plan   Assessment and Plan:  Diagnoses and all orders for this visit:    1. Bipolar disorder, current episode mixed, moderate (CMS/HCC) (Primary)  Assessment & Plan:  Psychological condition is worsening.  Medication changes per orders.  Psychological condition  will be reassessed in 4 weeks.  Due to her symptoms she was started on Seroquel nightly.     Orders:  -     QUEtiapine (SEROquel) 50 MG tablet; Take 0.5 tablets by mouth Every Night.  Dispense: 30 tablet; Refill: 1

## 2020-07-02 NOTE — ASSESSMENT & PLAN NOTE
Psychological condition is worsening.  Medication changes per orders.  Psychological condition  will be reassessed in 4 weeks.  Due to her symptoms she was started on Seroquel nightly.

## 2020-07-23 DIAGNOSIS — G43.719 INTRACTABLE CHRONIC MIGRAINE WITHOUT AURA AND WITHOUT STATUS MIGRAINOSUS: ICD-10-CM

## 2020-07-23 DIAGNOSIS — E03.9 HYPOTHYROIDISM, UNSPECIFIED TYPE: ICD-10-CM

## 2020-07-23 RX ORDER — LEVOTHYROXINE SODIUM 125 UG/1
TABLET ORAL
Qty: 30 TABLET | Refills: 0 | Status: SHIPPED | OUTPATIENT
Start: 2020-07-23 | End: 2020-08-27

## 2020-07-23 RX ORDER — TOPIRAMATE 50 MG/1
TABLET, FILM COATED ORAL
Qty: 30 TABLET | Refills: 0 | Status: SHIPPED | OUTPATIENT
Start: 2020-07-23 | End: 2020-09-24

## 2020-07-26 ENCOUNTER — E-VISIT (OUTPATIENT)
Dept: FAMILY MEDICINE CLINIC | Facility: CLINIC | Age: 40
End: 2020-07-26

## 2020-07-26 ENCOUNTER — NURSE TRIAGE (OUTPATIENT)
Dept: CALL CENTER | Facility: HOSPITAL | Age: 40
End: 2020-07-26

## 2020-07-26 DIAGNOSIS — R68.89 FLU-LIKE SYMPTOMS: Primary | ICD-10-CM

## 2020-07-27 NOTE — TELEPHONE ENCOUNTER
She has been sick 3 days, not feeling well today fever 102, has fever chills, congestion,no distress she says and has fatigue, headache, told her to [facility] PCP for televisit treat symptoms and call us back if needed, her wife has some symptoms she says to. No distress. Told her ifany distress be seen inER> Told her to stay quarantined. Triage for COVID done.     Reason for Disposition  • Fever present > 3 days (72 hours)    Additional Information  • Negative: SEVERE difficulty breathing (e.g., struggling for each breath, speaks in single words)  • Negative: Difficult to awaken or acting confused (e.g., disoriented, slurred speech)  • Negative: Bluish (or gray) lips or face now  • Negative: Shock suspected (e.g., cold/pale/clammy skin, too weak to stand, low BP, rapid pulse)  • Negative: Sounds like a life-threatening emergency to the triager  • Negative: [1] COVID-19 exposure AND [2] no symptoms  • Negative: COVID-19 and Breastfeeding, questions about  • Negative: [1] Adult with possible COVID-19 symptoms AND [2] triager concerned about severity of symptoms or other causes  • Negative: SEVERE or constant chest pain or pressure (Exception: mild central chest pain, present only when coughing)  • Negative: MODERATE difficulty breathing (e.g., speaks in phrases, SOB even at rest, pulse 100-120)  • Negative: Patient sounds very sick or weak to the triager  • Negative: MILD difficulty breathing (e.g., minimal/no SOB at rest, SOB with walking, pulse <100)  • Negative: Chest pain or pressure  • Negative: Fever > 103 F (39.4 C)  • Negative: [1] Fever > 101 F (38.3 C) AND [2] age > 60  • Negative: [1] Fever > 100.0 F (37.8 C) AND [2] bedridden (e.g., nursing home patient, CVA, chronic illness, recovering from surgery)  • Negative: HIGH RISK patient (e.g., age > 64 years, diabetes, heart or lung disease, weak immune system)  • Negative: [1] Fever returns after gone for over 24 hours AND [2] symptoms worse or not improved  •  "Negative: [1] Continuous (nonstop) coughing interferes with work or school AND [2] no improvement using cough treatment per protocol    Answer Assessment - Initial Assessment Questions  1. COVID-19 DIAGNOSIS: \"Who made your Coronavirus (COVID-19) diagnosis?\" \"Was it confirmed by a positive lab test?\" If not diagnosed by a HCP, ask \"Are there lots of cases (community spread) where you live?\" (See public health department website, if unsure)      No diagnosis yet  2. ONSET: \"When did the COVID-19 symptoms start?\"       3 days ago  3. WORST SYMPTOM: \"What is your worst symptom?\" (e.g., cough, fever, shortness of breath, muscle aches)      Muscle aches and fever  4. COUGH: \"Do you have a cough?\" If so, ask: \"How bad is the cough?\"        Does have cough moderate  5. FEVER: \"Do you have a fever?\" If so, ask: \"What is your temperature, how was it measured, and when did it start?\"      102  6. RESPIRATORY STATUS: \"Describe your breathing?\" (e.g., shortness of breath, wheezing, unable to speak)       Chest tightness, full, sob no wheezing  7. BETTER-SAME-WORSE: \"Are you getting better, staying the same or getting worse compared to yesterday?\"  If getting worse, ask, \"In what way?\"      Worse today, fever and congested  8. HIGH RISK DISEASE: \"Do you have any chronic medical problems?\" (e.g., asthma, heart or lung disease, weak immune system, etc.)      diabetic  9. PREGNANCY: \"Is there any chance you are pregnant?\" \"When was your last menstrual period?\"      no  10. OTHER SYMPTOMS: \"Do you have any other symptoms?\"  (e.g., chills, fatigue, headache, loss of smell or taste, muscle pain, sore throat)        Chills, fatigue,muscle pain,fever, sore throat, headache few days.just feels bad    Protocols used: CORONAVIRUS (COVID-19) DIAGNOSED OR SUSPECTED-ADULT-AH      "

## 2020-07-30 PROBLEM — R68.89 FLU-LIKE SYMPTOMS: Status: ACTIVE | Noted: 2020-07-30

## 2020-07-30 NOTE — PROGRESS NOTES
Roseanna Charles    1980  [id number]    I have reviewed the e-Visit questionnaire and patient's answers, my assessment and plan are as follows:    HPI  Patient is a 39-year-old female who has complaints of COVID like symptoms.  There are multiple things on the house that are set with similar symptoms patient is currently having difficulty due to fatigue, fever, coughing other flulike symptoms.  Patient reports needing a note indicating she should be on quarantine for 2 weeks.    Review of Systems - History obtained from chart review and questionnaire.      Diagnoses and all orders for this visit:    Flu-like symptoms  Comments:  Her symptoms could be due to COVID.  She was encouraged to call the clinic for a video visit in order to determine how ill patient is and write her an excuse fo    Spent a total of 7 minutes with patient discussing her illness.    Any medications prescribed have been sent electronically to   [facility] [address] [place] [address], [place] - [address] [facility] [address]  - [phone]  - [phone]   [address] [place] [address]  Phone: [phone] Fax: [phone]        Doug Recinos Sr, MD  07/30/20  10:20 AM

## 2020-07-31 ENCOUNTER — TELEMEDICINE (OUTPATIENT)
Dept: FAMILY MEDICINE CLINIC | Facility: CLINIC | Age: 40
End: 2020-07-31

## 2020-07-31 DIAGNOSIS — J06.9 VIRAL UPPER RESPIRATORY TRACT INFECTION: Primary | ICD-10-CM

## 2020-07-31 PROCEDURE — 99213 OFFICE O/P EST LOW 20 MIN: CPT | Performed by: FAMILY MEDICINE

## 2020-07-31 NOTE — PROGRESS NOTES
Chief Complaint   Patient presents with   • URI     Video Visit    History of Present Illness:  Subjective   Roseanna Charles is a 39 y.o. female.   URI    This is a new problem. The current episode started in the past 7 days. The problem has been gradually worsening. The maximum temperature recorded prior to her arrival was 102 - 102.9 F. The fever has been present for 1 to 2 days. Associated symptoms include abdominal pain, congestion, coughing, headaches, joint pain, a plugged ear sensation, sinus pain, sneezing and a sore throat. Pertinent negatives include no chest pain, diarrhea, dysuria, ear pain, joint swelling, nausea, neck pain, rash, rhinorrhea, swollen glands, vomiting or wheezing. Associated symptoms comments: Patient states that she has been feeling off for a little bit and she states that she has been having body aches and some shortness of breath and fevers of 102. She has had a sore throat as well. She states that she has not to her knowledge been around any sick contact but she states that her wife has not felt well either. She states that she has been sick on and off. She states that she is worried that she might have COVID. . She has tried nothing for the symptoms. The treatment provided no relief.        Allergies:  No Known Allergies    Social History:  Social History     Socioeconomic History   • Marital status:      Spouse name: Not on file   • Number of children: Not on file   • Years of education: Not on file   • Highest education level: Not on file   Tobacco Use   • Smoking status: Never Smoker   • Smokeless tobacco: Never Used   Substance and Sexual Activity   • Alcohol use: Yes     Comment: Occasional   • Drug use: No   • Sexual activity: Not Currently     Partners: Female     Comment:        Family History:  Family History   Problem Relation Age of Onset   • Hyperlipidemia Mother    • Diabetes Mother         Diabetes Mellitus   • Coronary artery disease Father    •  Hypertension Father    • Diabetes Father         Diabetes Mellitus   • COPD Son    • Coronary artery disease Paternal Grandmother    • Hypertension Paternal Grandmother    • Diabetes Paternal Grandmother         Diabetes Mellitus   • COPD Paternal Grandmother        Past Medical History :  Active Ambulatory Problems     Diagnosis Date Noted   • Anxiety and depression 07/01/2019   • Migraine 08/09/2016   • Obstructive sleep apnea 08/09/2016   • Anemia 07/12/2019   • Diabetes (CMS/HCC) 07/12/2019   • Hypothyroid 07/12/2019   • Knee pain, right 07/12/2019   • Malaise and fatigue 07/12/2019   • Migraine without aura and without status migrainosus, not intractable 07/12/2019   • Annual physical exam 08/10/2019   • Essential hypertension 08/10/2019   • Right hip pain 08/10/2019   • Right upper quadrant pain 03/19/2020   • Bacterial respiratory infection 06/01/2020   • Bipolar disorder, current episode mixed, moderate (CMS/[facility]) 07/02/2020   • Flu-like symptoms 07/30/2020   • Viral upper respiratory tract infection 07/31/2020     Resolved Ambulatory Problems     Diagnosis Date Noted   • No Resolved Ambulatory Problems     Past Medical History:   Diagnosis Date   • Abdominal pain    • Acute non-recurrent maxillary sinusitis    • Bacterial vaginosis    • Chicken pox    • History of ulcer disease    • Overweight (BMI 25.0-29.9)    • Upper respiratory infection, acute        Medication List:  Outpatient Encounter Medications as of 7/31/2020   Medication Sig Dispense Refill   • citalopram (CeleXA) 20 MG tablet Take 1 tablet by mouth Daily. 30 tablet 2   • diclofenac (VOLTAREN) 50 MG EC tablet Take 1 tablet by mouth 2 (Two) Times a Day As Needed (pain). 60 tablet 1   • dicyclomine (Bentyl) 20 MG tablet Take 1 tablet by mouth Every 6 (Six) Hours. 60 tablet 0   • EUTHYROX 125 MCG tablet Take 1 tablet by mouth once daily 30 tablet 0   • metFORMIN (GLUCOPHAGE) 500 MG tablet Take 1 tablet by mouth 2 (Two) Times a Day With Meals. 60  tablet 3   • ondansetron (ZOFRAN) 4 MG tablet Take 1 tablet by mouth Every 8 (Eight) Hours As Needed for Nausea or Vomiting. 30 tablet 0   • QUEtiapine (SEROquel) 50 MG tablet Take 0.5 tablets by mouth Every Night. 30 tablet 1   • SUMAtriptan (IMITREX) 50 MG tablet Take 1 tablet by mouth Every 2 (Two) Hours As Needed for Migraine (Max is 200mg a day.). 1 tab at onset of headache. Repeat 1time in 2 hrs 10 tablet 1   • topiramate (TOPAMAX) 50 MG tablet Take 1 tablet by mouth once daily 30 tablet 0     No facility-administered encounter medications on file as of 2020.        Past Surgical History:  Past Surgical History:   Procedure Laterality Date   •  SECTION      x2   • CYSTECTOMY      total   • LASER ABLATION          The following portions of the patient's history were reviewed and updated as appropriate: allergies, current medications, past family history, past medical history, past social history, past surgical history and problem list.    Review Of Systems:  Review of Systems   HENT: Positive for congestion, sneezing and sore throat. Negative for ear pain, rhinorrhea and swollen glands.    Respiratory: Positive for cough. Negative for wheezing.    Cardiovascular: Negative for chest pain.   Gastrointestinal: Positive for abdominal pain. Negative for diarrhea, nausea and vomiting.   Genitourinary: Negative for dysuria.   Musculoskeletal: Positive for joint pain. Negative for neck pain.   Skin: Negative for rash.       Objective     Physical Exam:  Vital Signs:  There were no vitals taken for this visit.    Physical Exam   Constitutional: She appears well-developed. She appears ill.       Assessment/Plan   Assessment and Plan:  Diagnoses and all orders for this visit:    1. Viral upper respiratory tract infection (Primary)  Assessment & Plan:  Unknown if patient has COVID-19 or flu.  Patient was advised through e-visit to get tested but she declined.  Patient was given a note for quarantine until her  symptoms resolve.      The use of a video visit has been reviewed with the patient and verbal informed consent has been obtained.       Spent 10 minutes with patient and greater than 50% of visit spent on counseling and coordination of care regarding the patient's illness, along with the pros and cons of various treatment options.

## 2020-07-31 NOTE — ASSESSMENT & PLAN NOTE
Unknown if patient has COVID-19 or flu.  Patient was advised through e-visit to get tested but she declined.  Patient was given a note for quarantine until her symptoms resolve.

## 2020-08-12 PROCEDURE — U0003 INFECTIOUS AGENT DETECTION BY NUCLEIC ACID (DNA OR RNA); SEVERE ACUTE RESPIRATORY SYNDROME CORONAVIRUS 2 (SARS-COV-2) (CORONAVIRUS DISEASE [COVID-19]), AMPLIFIED PROBE TECHNIQUE, MAKING USE OF HIGH THROUGHPUT TECHNOLOGIES AS DESCRIBED BY CMS-2020-01-R: HCPCS | Performed by: NURSE PRACTITIONER

## 2020-08-15 ENCOUNTER — TELEPHONE (OUTPATIENT)
Dept: URGENT CARE | Facility: CLINIC | Age: 40
End: 2020-08-15

## 2020-08-15 NOTE — TELEPHONE ENCOUNTER
LVM for patient to call [facility].    Please inform negative Covid test. Pt should continue quarantine as directed per CDC.

## 2020-08-27 DIAGNOSIS — E03.9 HYPOTHYROIDISM, UNSPECIFIED TYPE: ICD-10-CM

## 2020-08-27 RX ORDER — LEVOTHYROXINE SODIUM 125 UG/1
TABLET ORAL
Qty: 30 TABLET | Refills: 0 | Status: SHIPPED | OUTPATIENT
Start: 2020-08-27 | End: 2020-09-24

## 2020-09-22 DIAGNOSIS — F41.9 ANXIETY AND DEPRESSION: ICD-10-CM

## 2020-09-22 DIAGNOSIS — F32.A ANXIETY AND DEPRESSION: ICD-10-CM

## 2020-09-23 DIAGNOSIS — E03.9 HYPOTHYROIDISM, UNSPECIFIED TYPE: ICD-10-CM

## 2020-09-23 DIAGNOSIS — E11.9 TYPE 2 DIABETES MELLITUS WITHOUT COMPLICATION, WITH LONG-TERM CURRENT USE OF INSULIN (HCC): ICD-10-CM

## 2020-09-23 DIAGNOSIS — G43.719 INTRACTABLE CHRONIC MIGRAINE WITHOUT AURA AND WITHOUT STATUS MIGRAINOSUS: ICD-10-CM

## 2020-09-23 DIAGNOSIS — Z79.4 TYPE 2 DIABETES MELLITUS WITHOUT COMPLICATION, WITH LONG-TERM CURRENT USE OF INSULIN (HCC): ICD-10-CM

## 2020-09-23 RX ORDER — CITALOPRAM 20 MG/1
TABLET ORAL
Qty: 30 TABLET | Refills: 0 | Status: SHIPPED | OUTPATIENT
Start: 2020-09-23 | End: 2020-11-20

## 2020-09-24 RX ORDER — LEVOTHYROXINE SODIUM 125 UG/1
TABLET ORAL
Qty: 30 TABLET | Refills: 0 | Status: SHIPPED | OUTPATIENT
Start: 2020-09-24 | End: 2020-11-03

## 2020-09-24 RX ORDER — TOPIRAMATE 50 MG/1
TABLET, FILM COATED ORAL
Qty: 30 TABLET | Refills: 0 | Status: SHIPPED | OUTPATIENT
Start: 2020-09-24 | End: 2020-12-01

## 2020-10-07 ENCOUNTER — E-VISIT (OUTPATIENT)
Dept: FAMILY MEDICINE CLINIC | Facility: CLINIC | Age: 40
End: 2020-10-07

## 2020-10-07 ENCOUNTER — PATIENT MESSAGE (OUTPATIENT)
Dept: FAMILY MEDICINE CLINIC | Facility: CLINIC | Age: 40
End: 2020-10-07

## 2020-10-07 DIAGNOSIS — J06.9 UPPER RESPIRATORY TRACT INFECTION, UNSPECIFIED TYPE: Primary | ICD-10-CM

## 2020-10-07 PROCEDURE — 99421 OL DIG E/M SVC 5-10 MIN: CPT | Performed by: FAMILY MEDICINE

## 2020-10-08 NOTE — TELEPHONE ENCOUNTER
From: Roseanna Charles  To: Doug Recinos Sr, MD  Sent: 10/7/2020 2:36 PM EDT  Subject: Visit Follow-Up Question    Dr. Recinos,      I am sending this as an attachment to my e-visit and in regards to my work note request. I am hoping to be able to go back by 10/11/20 if I am feeling better. But I feel awful right now. Thank you for your time!     Roseanna Charles

## 2020-10-10 RX ORDER — CIPROFLOXACIN 500 MG/1
500 TABLET, FILM COATED ORAL 2 TIMES DAILY
Qty: 20 TABLET | Refills: 0 | Status: SHIPPED | OUTPATIENT
Start: 2020-10-10 | End: 2020-10-20

## 2020-10-10 RX ORDER — GUAIFENESIN AND CODEINE PHOSPHATE 100; 10 MG/5ML; MG/5ML
5 SOLUTION ORAL 3 TIMES DAILY PRN
Qty: 180 ML | Refills: 0 | Status: SHIPPED | OUTPATIENT
Start: 2020-10-10 | End: 2020-10-20

## 2020-10-22 DIAGNOSIS — G43.719 INTRACTABLE CHRONIC MIGRAINE WITHOUT AURA AND WITHOUT STATUS MIGRAINOSUS: ICD-10-CM

## 2020-10-22 RX ORDER — SUMATRIPTAN 50 MG/1
50 TABLET, FILM COATED ORAL
Qty: 10 TABLET | Refills: 1 | Status: SHIPPED | OUTPATIENT
Start: 2020-10-22 | End: 2021-05-29 | Stop reason: SDUPTHER

## 2020-11-02 DIAGNOSIS — Z79.4 TYPE 2 DIABETES MELLITUS WITHOUT COMPLICATION, WITH LONG-TERM CURRENT USE OF INSULIN (HCC): ICD-10-CM

## 2020-11-02 DIAGNOSIS — E03.9 HYPOTHYROIDISM, UNSPECIFIED TYPE: ICD-10-CM

## 2020-11-02 DIAGNOSIS — E11.9 TYPE 2 DIABETES MELLITUS WITHOUT COMPLICATION, WITH LONG-TERM CURRENT USE OF INSULIN (HCC): ICD-10-CM

## 2020-11-03 ENCOUNTER — E-VISIT (OUTPATIENT)
Dept: FAMILY MEDICINE CLINIC | Facility: CLINIC | Age: 40
End: 2020-11-03

## 2020-11-03 DIAGNOSIS — B96.89 BACTERIAL RESPIRATORY INFECTION: Primary | ICD-10-CM

## 2020-11-03 DIAGNOSIS — J98.8 BACTERIAL RESPIRATORY INFECTION: Primary | ICD-10-CM

## 2020-11-03 RX ORDER — LEVOTHYROXINE SODIUM 125 UG/1
TABLET ORAL
Qty: 30 TABLET | Refills: 0 | Status: SHIPPED | OUTPATIENT
Start: 2020-11-03 | End: 2021-01-06

## 2020-11-05 RX ORDER — CIPROFLOXACIN 500 MG/1
500 TABLET, FILM COATED ORAL 2 TIMES DAILY
Qty: 20 TABLET | Refills: 0 | Status: SHIPPED | OUTPATIENT
Start: 2020-11-05 | End: 2020-11-15

## 2020-11-05 NOTE — PROGRESS NOTES
Roseanna FOURNIER Melvin    1980  [id number]    I have reviewed the e-Visit questionnaire and patient's answers, my assessment and plan are as follows:    HPI  Patient states that she feels actually horrid and has had a fever of 100.4 greater.  She reports a runny nose, nasal congestion, headaches, body aches nausea vomiting diarrhea.    Review of Systems - History obtained from chart review from the ED visit questions.      Diagnoses and all orders for this visit:    1. Bacterial respiratory infection (Primary)  Comments:  Pt was prescribed Cipro to help treat her symptoms.   Orders:  -     ciprofloxacin (CIPRO) 500 MG tablet; Take 1 tablet by mouth 2 (Two) Times a Day for 10 days.  Dispense: 20 tablet; Refill: 0        Any medications prescribed have been sent electronically to   [facility] [address] [place], [place] [address], [place] - [address] [facility] [address]  - [phone]  - [phone]   [address]  [facility] [place] [address]  Phone: [phone] Fax: [phone]        Doug Recinos Sr, MD  11/11/20  08:22 EST

## 2020-11-06 ENCOUNTER — OFFICE VISIT (OUTPATIENT)
Dept: FAMILY MEDICINE CLINIC | Facility: CLINIC | Age: 40
End: 2020-11-06

## 2020-11-06 DIAGNOSIS — R50.9 FEVER, UNSPECIFIED FEVER CAUSE: Primary | ICD-10-CM

## 2020-11-06 LAB
EXPIRATION DATE: NORMAL
FLUAV AG NPH QL: NEGATIVE
FLUBV AG NPH QL: NEGATIVE
INTERNAL CONTROL: NORMAL
Lab: NORMAL

## 2020-11-06 PROCEDURE — 99212 OFFICE O/P EST SF 10 MIN: CPT | Performed by: FAMILY MEDICINE

## 2020-11-06 PROCEDURE — 87804 INFLUENZA ASSAY W/OPTIC: CPT | Performed by: FAMILY MEDICINE

## 2020-11-06 NOTE — PROGRESS NOTES
Chief Complaint   Patient presents with   • Fever     prolonged       History of Present Illness:  Subjective   Roseanna Charles is a 40 y.o. female.   History of Present Illness   Fever:   Patient has had a fever off/on since this past Sunday. She had an E-Visit 11/3/20. She was prescribed Cipro- she is still taking this. She reports she woke up with a fever this morning of 102.4.   She is also experiencing cough, shortness of breath, fever, chills, diarrhea, nausea and headache.     Allergies:  No Known Allergies    Social History:  Social History     Socioeconomic History   • Marital status:      Spouse name: Not on file   • Number of children: Not on file   • Years of education: Not on file   • Highest education level: Not on file   Tobacco Use   • Smoking status: Never Smoker   • Smokeless tobacco: Never Used   Substance and Sexual Activity   • Alcohol use: Yes     Comment: Occasional   • Drug use: No   • Sexual activity: Not Currently     Partners: Female     Comment:        Family History:  Family History   Problem Relation Age of Onset   • Hyperlipidemia Mother    • Diabetes Mother         Diabetes Mellitus   • Coronary artery disease Father    • Hypertension Father    • Diabetes Father         Diabetes Mellitus   • COPD Son    • Coronary artery disease Paternal Grandmother    • Hypertension Paternal Grandmother    • Diabetes Paternal Grandmother         Diabetes Mellitus   • COPD Paternal Grandmother        Past Medical History :  Active Ambulatory Problems     Diagnosis Date Noted   • Anxiety and depression 07/01/2019   • Migraine 08/09/2016   • Obstructive sleep apnea 08/09/2016   • Anemia 07/12/2019   • Diabetes (CMS/[facility]) 07/12/2019   • Hypothyroid 07/12/2019   • Knee pain, right 07/12/2019   • Malaise and fatigue 07/12/2019   • Migraine without aura and without status migrainosus, not intractable 07/12/2019   • Annual physical exam 08/10/2019   • Essential hypertension 08/10/2019   •  Right hip pain 08/10/2019   • Right upper quadrant pain 03/19/2020   • Bacterial respiratory infection 06/01/2020   • Bipolar disorder, current episode mixed, moderate (CMS/HCC) 07/02/2020   • Flu-like symptoms 07/30/2020   • Viral upper respiratory tract infection 07/31/2020   • Fever 11/06/2020     Resolved Ambulatory Problems     Diagnosis Date Noted   • No Resolved Ambulatory Problems     Past Medical History:   Diagnosis Date   • Abdominal pain    • Acute non-recurrent maxillary sinusitis    • Bacterial vaginosis    • Chicken pox    • History of ulcer disease    • Overweight (BMI 25.0-29.9)    • Upper respiratory infection, acute        Medication List:  Outpatient Encounter Medications as of 11/6/2020   Medication Sig Dispense Refill   • amoxicillin-clavulanate (Augmentin) 875-125 MG per tablet Take 1 tablet by mouth Every 12 (Twelve) Hours. 20 tablet 0   • brompheniramine-pseudoephedrine-DM (Bromfed DM) 30-2-10 MG/5ML syrup Take 5 mL by mouth 4 (Four) Times a Day As Needed for Cough. 119 mL 0   • ciprofloxacin (CIPRO) 500 MG tablet Take 1 tablet by mouth 2 (Two) Times a Day for 10 days. 20 tablet 0   • citalopram (CeleXA) 20 MG tablet Take 1 tablet by mouth once daily 30 tablet 0   • diclofenac (VOLTAREN) 50 MG EC tablet Take 1 tablet by mouth 2 (Two) Times a Day As Needed (pain). 60 tablet 1   • dicyclomine (Bentyl) 20 MG tablet Take 1 tablet by mouth Every 6 (Six) Hours. 60 tablet 0   • Euthyrox 125 MCG tablet Take 1 tablet by mouth once daily 30 tablet 0   • fluticasone (Flonase) 50 MCG/ACT nasal spray 2 sprays into the nostril(s) as directed by provider Daily. 2 puffs each nostril 1 bottle 0   • Loratadine (Claritin) 10 MG capsule Take 10 mg by mouth Daily As Needed (congestion). 30 each 0   • metFORMIN (GLUCOPHAGE) 500 MG tablet TAKE 1 TABLET BY MOUTH TWICE DAILY WITH MEALS 60 tablet 0   • ondansetron (ZOFRAN) 4 MG tablet Take 1 tablet by mouth Every 8 (Eight) Hours As Needed for Nausea or Vomiting. 30  tablet 0   • QUEtiapine (SEROquel) 50 MG tablet Take 0.5 tablets by mouth Every Night. 30 tablet 1   • SUMAtriptan (IMITREX) 50 MG tablet Take 1 tablet by mouth Every 2 (Two) Hours As Needed for Migraine (Max is 200mg a day.). 1 tab at onset of headache. Repeat 1time in 2 hrs 10 tablet 1   • topiramate (TOPAMAX) 50 MG tablet Take 1 tablet by mouth once daily 30 tablet 0     No facility-administered encounter medications on file as of 2020.        Past Surgical History:  Past Surgical History:   Procedure Laterality Date   •  SECTION      x2   • CYSTECTOMY      total   • LASER ABLATION          The following portions of the patient's history were reviewed and updated as appropriate: allergies, current medications, past family history, past medical history, past social history, past surgical history and problem list.    Review Of Systems:  Review of Systems    Objective     Physical Exam:  Vital Signs:  There were no vitals taken for this visit.    Physical Exam      Assessment/Plan   Assessment and Plan:  Diagnoses and all orders for this visit:    1. Fever, unspecified fever cause (Primary)  Assessment & Plan:  Covid test and flu test were ordered.  Flu test was negative.  Covid test pending.    Orders:  -     COVID-19,[facility] ROUTINE, NP/OP SWAB IN TRANSPORT MEDIA OR ESWAB 72 HR TAT - Swab, Anterior nasal  -     POCT Influenza A/B

## 2020-11-09 LAB — SARS-COV-2 RNA RESP QL NAA+PROBE: NOT DETECTED

## 2020-11-20 DIAGNOSIS — F41.9 ANXIETY AND DEPRESSION: ICD-10-CM

## 2020-11-20 DIAGNOSIS — F32.A ANXIETY AND DEPRESSION: ICD-10-CM

## 2020-11-20 RX ORDER — CITALOPRAM 20 MG/1
TABLET ORAL
Qty: 90 TABLET | Refills: 1 | Status: SHIPPED | OUTPATIENT
Start: 2020-11-20 | End: 2021-04-26

## 2020-12-01 DIAGNOSIS — G43.719 INTRACTABLE CHRONIC MIGRAINE WITHOUT AURA AND WITHOUT STATUS MIGRAINOSUS: ICD-10-CM

## 2020-12-01 RX ORDER — TOPIRAMATE 50 MG/1
TABLET, FILM COATED ORAL
Qty: 30 TABLET | Refills: 3 | Status: SHIPPED | OUTPATIENT
Start: 2020-12-01 | End: 2021-04-26

## 2020-12-05 ENCOUNTER — E-VISIT (OUTPATIENT)
Dept: FAMILY MEDICINE CLINIC | Facility: CLINIC | Age: 40
End: 2020-12-05

## 2020-12-09 ENCOUNTER — OFFICE VISIT (OUTPATIENT)
Dept: FAMILY MEDICINE CLINIC | Facility: CLINIC | Age: 40
End: 2020-12-09

## 2020-12-09 VITALS
TEMPERATURE: 99.5 F | DIASTOLIC BLOOD PRESSURE: 80 MMHG | HEIGHT: 63 IN | HEART RATE: 77 BPM | OXYGEN SATURATION: 99 % | WEIGHT: 190.2 LBS | SYSTOLIC BLOOD PRESSURE: 140 MMHG | RESPIRATION RATE: 18 BRPM | BODY MASS INDEX: 33.7 KG/M2

## 2020-12-09 DIAGNOSIS — H65.01 NON-RECURRENT ACUTE SEROUS OTITIS MEDIA OF RIGHT EAR: ICD-10-CM

## 2020-12-09 DIAGNOSIS — J06.9 VIRAL UPPER RESPIRATORY TRACT INFECTION: Primary | ICD-10-CM

## 2020-12-09 PROCEDURE — 99213 OFFICE O/P EST LOW 20 MIN: CPT | Performed by: FAMILY MEDICINE

## 2020-12-09 RX ORDER — FLUOXETINE HYDROCHLORIDE 20 MG/1
CAPSULE ORAL
COMMUNITY
End: 2021-12-06

## 2020-12-09 RX ORDER — LEVONORGESTREL AND ETHINYL ESTRADIOL 0.1-0.02MG
KIT ORAL
COMMUNITY
Start: 2020-11-24 | End: 2021-12-06

## 2020-12-09 RX ORDER — PREDNISONE 20 MG/1
40 TABLET ORAL DAILY
Qty: 10 TABLET | Refills: 0 | Status: SHIPPED | OUTPATIENT
Start: 2020-12-09 | End: 2020-12-14

## 2020-12-09 RX ORDER — CIPROFLOXACIN 500 MG/1
500 TABLET, FILM COATED ORAL 2 TIMES DAILY
Qty: 20 TABLET | Refills: 0 | Status: SHIPPED | OUTPATIENT
Start: 2020-12-09 | End: 2021-12-06

## 2020-12-09 NOTE — PROGRESS NOTES
Chief Complaint   Patient presents with   • URI       History of Present Illness:  Subjective   Roseanna Charles is a 40 y.o. female.   URI   This is a new problem. The current episode started in the past 7 days (started saturday for her ). The problem has been gradually improving. The maximum temperature recorded prior to her arrival was 101 - 101.9 F (She states that she could tell she had a fever but she states that she did not take it ). The fever has been present for 1 to 2 days. Associated symptoms include abdominal pain, chest pain (She states that she has felt if she has something on her chest but she states that she has been coughing as well wiht no sputum coming up), congestion, coughing (No production), diarrhea, headaches, joint pain, joint swelling, nausea, sinus pain and vomiting. Pertinent negatives include no dysuria, ear pain, neck pain, plugged ear sensation, rash, rhinorrhea, sneezing, sore throat, swollen glands or wheezing. Associated symptoms comments: The patient states that she has been feeling bad since Saturday but she has also had an encounter in august where she went to urgent care with these symptoms. She states that she has been having nausea and vomiting, diarrhea, stomach aches and body aches and she states that she felt it might be food poisoning but she states that she has been very fatigued and she states that she has no energy. She states that she felt she was fevering but did not check her temperature. She has been tested for covid twice and flu swabbed on 11/6/2020 and they was negative. She states that she really is not sure as to if she has been around anyone positive and or with symptoms. . She has tried nothing (She has not tried to take anything as she states that she was not sure as to what it was ) for the symptoms. The treatment provided no relief.        Allergies:  No Known Allergies    Social History:  Social History     Socioeconomic History   • Marital status:       Spouse name: Not on file   • Number of children: Not on file   • Years of education: Not on file   • Highest education level: Not on file   Tobacco Use   • Smoking status: Never Smoker   • Smokeless tobacco: Never Used   Substance and Sexual Activity   • Alcohol use: Yes     Comment: Occasional   • Drug use: No   • Sexual activity: Not Currently     Partners: Female     Comment:        Family History:  Family History   Problem Relation Age of Onset   • Hyperlipidemia Mother    • Diabetes Mother         Diabetes Mellitus   • Coronary artery disease Father    • Hypertension Father    • Diabetes Father         Diabetes Mellitus   • COPD Son    • Coronary artery disease Paternal Grandmother    • Hypertension Paternal Grandmother    • Diabetes Paternal Grandmother         Diabetes Mellitus   • COPD Paternal Grandmother        Past Medical History :  Active Ambulatory Problems     Diagnosis Date Noted   • Anxiety and depression 07/01/2019   • Migraine 08/09/2016   • Obstructive sleep apnea 08/09/2016   • Anemia 07/12/2019   • Diabetes (CMS/[facility]) 07/12/2019   • Hypothyroid 07/12/2019   • Knee pain, right 07/12/2019   • Malaise and fatigue 07/12/2019   • Migraine without aura and without status migrainosus, not intractable 07/12/2019   • Annual physical exam 08/10/2019   • Essential hypertension 08/10/2019   • Right hip pain 08/10/2019   • Right upper quadrant pain 03/19/2020   • Bacterial respiratory infection 06/01/2020   • Bipolar disorder, current episode mixed, moderate (CMS/[facility]) 07/02/2020   • Flu-like symptoms 07/30/2020   • Viral upper respiratory tract infection 07/31/2020   • Fever 11/06/2020   • Non-recurrent acute serous otitis media of right ear 12/09/2020     Resolved Ambulatory Problems     Diagnosis Date Noted   • No Resolved Ambulatory Problems     Past Medical History:   Diagnosis Date   • Abdominal pain    • Acute non-recurrent maxillary sinusitis    • Bacterial vaginosis    • Chicken  pox    • History of ulcer disease    • Overweight (BMI 25.0-29.9)    • Upper respiratory infection, acute        Medication List:  Outpatient Encounter Medications as of 12/9/2020   Medication Sig Dispense Refill   • Aviane 0.1-20 MG-MCG per tablet      • citalopram (CeleXA) 20 MG tablet Take 1 tablet by mouth once daily 90 tablet 1   • diclofenac (VOLTAREN) 50 MG EC tablet Take 1 tablet by mouth 2 (Two) Times a Day As Needed (pain). 60 tablet 1   • dicyclomine (Bentyl) 20 MG tablet Take 1 tablet by mouth Every 6 (Six) Hours. 60 tablet 0   • Euthyrox 125 MCG tablet Take 1 tablet by mouth once daily 30 tablet 0   • FLUoxetine HCl, PMDD, 20 MG capsule      • fluticasone (Flonase) 50 MCG/ACT nasal spray 2 sprays into the nostril(s) as directed by provider Daily. 2 puffs each nostril 1 bottle 0   • Loratadine (Claritin) 10 MG capsule Take 10 mg by mouth Daily As Needed (congestion). 30 each 0   • metFORMIN (GLUCOPHAGE) 500 MG tablet TAKE 1 TABLET BY MOUTH TWICE DAILY WITH MEALS 60 tablet 0   • ondansetron (ZOFRAN) 4 MG tablet Take 1 tablet by mouth Every 8 (Eight) Hours As Needed for Nausea or Vomiting. 30 tablet 0   • QUEtiapine (SEROquel) 50 MG tablet Take 0.5 tablets by mouth Every Night. 30 tablet 1   • SUMAtriptan (IMITREX) 50 MG tablet Take 1 tablet by mouth Every 2 (Two) Hours As Needed for Migraine (Max is 200mg a day.). 1 tab at onset of headache. Repeat 1time in 2 hrs 10 tablet 1   • topiramate (TOPAMAX) 50 MG tablet Take 1 tablet by mouth once daily 30 tablet 3   • ciprofloxacin (CIPRO) 500 MG tablet Take 1 tablet by mouth 2 (Two) Times a Day. 20 tablet 0   • predniSONE (DELTASONE) 20 MG tablet Take 2 tablets by mouth Daily for 5 days. 10 tablet 0   • [DISCONTINUED] amoxicillin-clavulanate (Augmentin) 875-125 MG per tablet Take 1 tablet by mouth Every 12 (Twelve) Hours. 20 tablet 0   • [DISCONTINUED] brompheniramine-pseudoephedrine-DM (Bromfed DM) 30-2-10 MG/5ML syrup Take 5 mL by mouth 4 (Four) Times a Day  "As Needed for Cough. 119 mL 0     No facility-administered encounter medications on file as of 2020.        Past Surgical History:  Past Surgical History:   Procedure Laterality Date   •  SECTION      x2   • CYSTECTOMY      total   • LASER ABLATION          The following portions of the patient's history were reviewed and updated as appropriate: allergies, current medications, past family history, past medical history, past social history, past surgical history and problem list.    Review Of Systems:  Review of Systems   Constitutional: Negative for activity change, appetite change and fatigue.   HENT: Positive for congestion. Negative for ear discharge, ear pain, postnasal drip, rhinorrhea, sneezing, sore throat and swollen glands.    Eyes: Negative for double vision and discharge.   Respiratory: Positive for cough (No production). Negative for chest tightness, shortness of breath and wheezing.    Cardiovascular: Positive for chest pain (She states that she has felt if she has something on her chest but she states that she has been coughing as well wiht no sputum coming up).   Gastrointestinal: Positive for abdominal pain, diarrhea, nausea and vomiting.   Endocrine: Negative for cold intolerance and heat intolerance.   Genitourinary: Negative for dysuria.   Musculoskeletal: Positive for joint pain. Negative for back pain, gait problem, joint swelling and neck pain.   Skin: Negative for color change and rash.   Neurological: Negative for dizziness, facial asymmetry and confusion.   Psychiatric/Behavioral: Negative for behavioral problems.       Objective     Physical Exam:  Vital Signs:  Visit Vitals  /80   Pulse 77   Temp 99.5 °F (37.5 °C)   Resp 18   Ht 160 cm (63\")   Wt 86.3 kg (190 lb 3.2 oz)   SpO2 99%   BMI 33.69 kg/m²       Physical Exam  Vitals signs reviewed.   Constitutional:       Appearance: She is well-developed.   HENT:      Head: Normocephalic.      Right Ear: External ear normal. " Tympanic membrane is injected and bulging.      Left Ear: External ear normal.      Nose: Nose normal.   Eyes:      Conjunctiva/sclera: Conjunctivae normal.   Neck:      Musculoskeletal: Normal range of motion and neck supple.   Cardiovascular:      Rate and Rhythm: Normal rate and regular rhythm.   Pulmonary:      Effort: Pulmonary effort is normal.      Breath sounds: Normal breath sounds.   Musculoskeletal: Normal range of motion.   Skin:     General: Skin is warm and dry.      Capillary Refill: Capillary refill takes less than 2 seconds.   Neurological:      Mental Status: She is alert and oriented to person, place, and time.           Assessment/Plan   Assessment and Plan:  Diagnoses and all orders for this visit:    1. Viral upper respiratory tract infection (Primary)  Assessment & Plan:  Unknown etiology of symptoms.  Patient was tested for COVID-19.    Orders:  -     COVID-19,[facility] ROUTINE, NP/OP SWAB IN TRANSPORT MEDIA OR ESWAB 72 HR TAT - Swab, Anterior nasal  -     predniSONE (DELTASONE) 20 MG tablet; Take 2 tablets by mouth Daily for 5 days.  Dispense: 10 tablet; Refill: 0    2. Non-recurrent acute serous otitis media of right ear  Assessment & Plan:  Patient was started on Cipro and prednisone to treat her symptoms.    Orders:  -     predniSONE (DELTASONE) 20 MG tablet; Take 2 tablets by mouth Daily for 5 days.  Dispense: 10 tablet; Refill: 0  -     ciprofloxacin (CIPRO) 500 MG tablet; Take 1 tablet by mouth 2 (Two) Times a Day.  Dispense: 20 tablet; Refill: 0                     Answers for HPI/ROS submitted by the patient on 12/9/2020   What is the primary reason for your visit?: Other  Please describe your symptoms.: Nausea, stomach cramps, fatigue, vomiting, diarrhea. The vomiting and diarrhea have subsided though.  Have you had these symptoms before?: Yes  How long have you been having these symptoms?: 5-7 days  Please describe any probable cause for these symptoms. : Stomach flu, Possible food  poisoning.

## 2020-12-10 LAB — SARS-COV-2 RNA RESP QL NAA+PROBE: NOT DETECTED

## 2020-12-28 NOTE — PROGRESS NOTES
Due to patient continuously having evisits with similar problems she was advised to come into the clinic to be evaluated face to face.

## 2021-01-05 DIAGNOSIS — E03.9 HYPOTHYROIDISM, UNSPECIFIED TYPE: ICD-10-CM

## 2021-01-06 RX ORDER — LEVOTHYROXINE SODIUM 125 UG/1
TABLET ORAL
Qty: 30 TABLET | Refills: 0 | Status: SHIPPED | OUTPATIENT
Start: 2021-01-06 | End: 2021-02-09

## 2021-02-09 DIAGNOSIS — E03.9 HYPOTHYROIDISM, UNSPECIFIED TYPE: ICD-10-CM

## 2021-02-09 DIAGNOSIS — F31.62 BIPOLAR DISORDER, CURRENT EPISODE MIXED, MODERATE (HCC): ICD-10-CM

## 2021-02-09 RX ORDER — QUETIAPINE FUMARATE 50 MG/1
TABLET, FILM COATED ORAL
Qty: 30 TABLET | Refills: 0 | Status: SHIPPED | OUTPATIENT
Start: 2021-02-09 | End: 2021-04-26

## 2021-02-09 RX ORDER — LEVOTHYROXINE SODIUM 125 UG/1
TABLET ORAL
Qty: 30 TABLET | Refills: 0 | Status: SHIPPED | OUTPATIENT
Start: 2021-02-09 | End: 2021-04-26

## 2021-03-14 ENCOUNTER — E-VISIT (OUTPATIENT)
Dept: FAMILY MEDICINE CLINIC | Facility: CLINIC | Age: 41
End: 2021-03-14

## 2021-03-14 DIAGNOSIS — R68.89 FLU-LIKE SYMPTOMS: Primary | ICD-10-CM

## 2021-03-14 PROCEDURE — 99441 PR PHYS/QHP TELEPHONE EVALUATION 5-10 MIN: CPT | Performed by: FAMILY MEDICINE

## 2021-03-15 DIAGNOSIS — E11.9 TYPE 2 DIABETES MELLITUS WITHOUT COMPLICATION, WITH LONG-TERM CURRENT USE OF INSULIN (HCC): ICD-10-CM

## 2021-03-15 DIAGNOSIS — Z79.4 TYPE 2 DIABETES MELLITUS WITHOUT COMPLICATION, WITH LONG-TERM CURRENT USE OF INSULIN (HCC): ICD-10-CM

## 2021-03-15 RX ORDER — OSELTAMIVIR PHOSPHATE 75 MG/1
75 CAPSULE ORAL 2 TIMES DAILY
Qty: 10 CAPSULE | Refills: 0 | Status: SHIPPED | OUTPATIENT
Start: 2021-03-15 | End: 2021-12-06

## 2021-03-18 ENCOUNTER — OFFICE VISIT (OUTPATIENT)
Dept: FAMILY MEDICINE CLINIC | Facility: CLINIC | Age: 41
End: 2021-03-18

## 2021-03-18 VITALS
HEART RATE: 61 BPM | OXYGEN SATURATION: 96 % | BODY MASS INDEX: 33.69 KG/M2 | HEIGHT: 63 IN | DIASTOLIC BLOOD PRESSURE: 62 MMHG | SYSTOLIC BLOOD PRESSURE: 118 MMHG | RESPIRATION RATE: 16 BRPM | TEMPERATURE: 97.5 F

## 2021-03-18 DIAGNOSIS — R09.89 SYMPTOMS OF UPPER RESPIRATORY INFECTION (URI): Primary | ICD-10-CM

## 2021-03-18 DIAGNOSIS — R10.11 RIGHT UPPER QUADRANT PAIN: ICD-10-CM

## 2021-03-18 PROCEDURE — 99213 OFFICE O/P EST LOW 20 MIN: CPT | Performed by: FAMILY MEDICINE

## 2021-03-18 RX ORDER — ONDANSETRON 4 MG/1
4 TABLET, FILM COATED ORAL EVERY 8 HOURS PRN
Qty: 30 TABLET | Refills: 0 | Status: SHIPPED | OUTPATIENT
Start: 2021-03-18 | End: 2022-02-19

## 2021-03-18 NOTE — ASSESSMENT & PLAN NOTE
Patient is well had no nausea with this upper quadrant pain.  Patient was prescribed ondansetron to treat her symptoms.

## 2021-03-18 NOTE — PROGRESS NOTES
Chief Complaint   Patient presents with   • Flu Symptoms       History of Present Illness:  Subjective   Roseanna Charles is a 40 y.o. female.   Flu Symptoms  This is a new problem. Episode onset: 03/14/2021. The problem occurs constantly. The problem has been unchanged. Associated symptoms include abdominal pain, chills, congestion, coughing, fatigue, a fever, headaches and nausea. Pertinent negatives include no chest pain, joint swelling or rash. Treatments tried: Tamiflu. The treatment provided no relief.        Allergies:  No Known Allergies    Social History:  Social History     Socioeconomic History   • Marital status:      Spouse name: Not on file   • Number of children: Not on file   • Years of education: Not on file   • Highest education level: Not on file   Tobacco Use   • Smoking status: Never Smoker   • Smokeless tobacco: Never Used   Substance and Sexual Activity   • Alcohol use: Yes     Comment: Occasional   • Drug use: No   • Sexual activity: Not Currently     Partners: Female     Comment:        Family History:  Family History   Problem Relation Age of Onset   • Hyperlipidemia Mother    • Diabetes Mother         Diabetes Mellitus   • Coronary artery disease Father    • Hypertension Father    • Diabetes Father         Diabetes Mellitus   • COPD Son    • Coronary artery disease Paternal Grandmother    • Hypertension Paternal Grandmother    • Diabetes Paternal Grandmother         Diabetes Mellitus   • COPD Paternal Grandmother        Past Medical History :  Active Ambulatory Problems     Diagnosis Date Noted   • Anxiety and depression 07/01/2019   • Migraine 08/09/2016   • Obstructive sleep apnea 08/09/2016   • Anemia 07/12/2019   • Diabetes (CMS/[facility]) 07/12/2019   • Hypothyroid 07/12/2019   • Knee pain, right 07/12/2019   • Malaise and fatigue 07/12/2019   • Migraine without aura and without status migrainosus, not intractable 07/12/2019   • Annual physical exam 08/10/2019   • Essential  hypertension 08/10/2019   • Right hip pain 08/10/2019   • Right upper quadrant pain 03/19/2020   • Bacterial respiratory infection 06/01/2020   • Bipolar disorder, current episode mixed, moderate (CMS/HCC) 07/02/2020   • Flu-like symptoms 07/30/2020   • Viral upper respiratory tract infection 07/31/2020   • Fever 11/06/2020   • Non-recurrent acute serous otitis media of right ear 12/09/2020   • Symptoms of upper respiratory infection (URI) 03/18/2021     Resolved Ambulatory Problems     Diagnosis Date Noted   • No Resolved Ambulatory Problems     Past Medical History:   Diagnosis Date   • Abdominal pain    • Acute non-recurrent maxillary sinusitis    • Bacterial vaginosis    • Chicken pox    • History of ulcer disease    • Overweight (BMI 25.0-29.9)    • Upper respiratory infection, acute        Medication List:  Outpatient Encounter Medications as of 3/18/2021   Medication Sig Dispense Refill   • citalopram (CeleXA) 20 MG tablet Take 1 tablet by mouth once daily 90 tablet 1   • diclofenac (VOLTAREN) 50 MG EC tablet Take 1 tablet by mouth 2 (Two) Times a Day As Needed (pain). 60 tablet 1   • dicyclomine (Bentyl) 20 MG tablet Take 1 tablet by mouth Every 6 (Six) Hours. 60 tablet 0   • Euthyrox 125 MCG tablet Take 1 tablet by mouth once daily 30 tablet 0   • fluticasone (Flonase) 50 MCG/ACT nasal spray 2 sprays into the nostril(s) as directed by provider Daily. 2 puffs each nostril 1 bottle 0   • Loratadine (Claritin) 10 MG capsule Take 10 mg by mouth Daily As Needed (congestion). 30 each 0   • metFORMIN (GLUCOPHAGE) 500 MG tablet TAKE 1 TABLET BY MOUTH TWICE DAILY WITH MEALS 180 tablet 1   • ondansetron (ZOFRAN) 4 MG tablet Take 1 tablet by mouth Every 8 (Eight) Hours As Needed for Nausea or Vomiting. 30 tablet 0   • oseltamivir (TAMIFLU) 75 MG capsule Take 1 capsule by mouth 2 (Two) Times a Day. 10 capsule 0   • QUEtiapine (SEROquel) 50 MG tablet TAKE 1/2 (ONE-HALF) TABLET BY MOUTH ONCE DAILY AT NIGHT 30 tablet 0    • SUMAtriptan (IMITREX) 50 MG tablet Take 1 tablet by mouth Every 2 (Two) Hours As Needed for Migraine (Max is 200mg a day.). 1 tab at onset of headache. Repeat 1time in 2 hrs 10 tablet 1   • topiramate (TOPAMAX) 50 MG tablet Take 1 tablet by mouth once daily 30 tablet 3   • [DISCONTINUED] ondansetron (ZOFRAN) 4 MG tablet Take 1 tablet by mouth Every 8 (Eight) Hours As Needed for Nausea or Vomiting. 30 tablet 0   • Aviane 0.1-20 MG-MCG per tablet      • ciprofloxacin (CIPRO) 500 MG tablet Take 1 tablet by mouth 2 (Two) Times a Day. 20 tablet 0   • FLUoxetine HCl, PMDD, 20 MG capsule        No facility-administered encounter medications on file as of 3/18/2021.       Past Surgical History:  Past Surgical History:   Procedure Laterality Date   •  SECTION      x2   • CYSTECTOMY      total   • LASER ABLATION          The following portions of the patient's history were reviewed and updated as appropriate: allergies, current medications, past family history, past medical history, past social history, past surgical history and problem list.    Review Of Systems:  Review of Systems   Constitutional: Positive for chills, fatigue and fever. Negative for activity change and appetite change.   HENT: Positive for congestion. Negative for ear discharge, ear pain, postnasal drip and rhinorrhea.    Eyes: Negative for double vision and discharge.   Respiratory: Positive for cough. Negative for chest tightness and shortness of breath.    Cardiovascular: Negative for chest pain.   Gastrointestinal: Positive for abdominal pain and nausea.   Endocrine: Negative for cold intolerance and heat intolerance.   Musculoskeletal: Negative for back pain, gait problem and joint swelling.   Skin: Negative for color change and rash.   Neurological: Negative for dizziness, facial asymmetry and confusion.   Psychiatric/Behavioral: Negative for behavioral problems.       Objective     Physical Exam:  Vital Signs:  Visit Vitals  /62  "(BP Location: Right arm, Patient Position: Sitting, Cuff Size: Adult)   Pulse 61   Temp 97.5 °F (36.4 °C) (Skin)   Resp 16   Ht 160 cm (63\")   SpO2 96%   BMI 33.69 kg/m²       Physical Exam  Vitals reviewed.   Constitutional:       Appearance: She is well-developed.   HENT:      Head: Normocephalic.      Right Ear: External ear normal.      Left Ear: External ear normal.      Nose: Nose normal.      Mouth/Throat:      Pharynx: Posterior oropharyngeal erythema present.   Eyes:      Conjunctiva/sclera: Conjunctivae normal.   Cardiovascular:      Rate and Rhythm: Normal rate and regular rhythm.   Pulmonary:      Effort: Pulmonary effort is normal.      Breath sounds: Normal breath sounds.   Musculoskeletal:         General: Normal range of motion.      Cervical back: Normal range of motion and neck supple.   Skin:     General: Skin is warm and dry.      Capillary Refill: Capillary refill takes less than 2 seconds.   Neurological:      Mental Status: She is alert and oriented to person, place, and time.           Assessment/Plan   Assessment and Plan:  Diagnoses and all orders for this visit:    1. Symptoms of upper respiratory infection (URI) (Primary)  Assessment & Plan:  she was prescribed Tamiflu to treat her symptoms.    Increase fluids. Tylenol/motrin for pain or fever.   Medication and medication adverse effects discussed.    Follow-up 5-7 days for reevaluation if not improved or sooner if needed.    Orders:  -     COVID-19,[facility],NP/OP Swab in Transport Media or ESwab 72 HR TAT - Swab, Nasopharynx    2. Right upper quadrant pain  Assessment & Plan:  Patient is well had no nausea with this upper quadrant pain.  Patient was prescribed ondansetron to treat her symptoms.    Orders:  -     ondansetron (ZOFRAN) 4 MG tablet; Take 1 tablet by mouth Every 8 (Eight) Hours As Needed for Nausea or Vomiting.  Dispense: 30 tablet; Refill: 0                     Answers for HPI/ROS submitted by the patient on 3/18/2021  Please " describe your symptoms.: Body aches, nausea, fever, intermittent coughing, fatigue, congestion.  Have you had these symptoms before?: Yes  How long have you been having these symptoms?: 5-7 days  Please list any medications you are currently taking for this condition.: Off brand of tamiflu  Please describe any probable cause for these symptoms. : Not sure  What is the primary reason for your visit?: Other

## 2021-03-18 NOTE — ASSESSMENT & PLAN NOTE
she was prescribed Tamiflu to treat her symptoms.    Increase fluids. Tylenol/motrin for pain or fever.   Medication and medication adverse effects discussed.    Follow-up 5-7 days for reevaluation if not improved or sooner if needed.

## 2021-03-19 LAB — SARS-COV-2 RNA RESP QL NAA+PROBE: NOT DETECTED

## 2021-03-25 NOTE — PROGRESS NOTES
Roseanna Charles    1980  [id number]    I have reviewed the e-Visit questionnaire and patient's answers, my assessment and plan are as follows:    HPI  Patient is a 40-year-old female that is being seen via the E-visit for possible flulike symptoms.  This patient is currently having flulike symptoms at least once or twice a month.  She has had multiple Covid 19 test that have all been negative.  Patient states she has not had any sick contacts but has a dry cough, headaches,fatigue, body aches and nausea     review of Systems - Negative except what is in the HPI      Diagnoses and all orders for this visit:    1. Flu-like symptoms (Primary)  Assessment & Plan:  Discussed viral URI's, cause, typical course and treatment options. Discussed that antibiotics do not shorten the duration of viral illnesses. Nasal saline/suction bulb, cool mist humidifier, postural drainage discussed in office today.  Reviewed s/s needing further investigation and those for which to present to ER.    Orders:  -     oseltamivir (TAMIFLU) 75 MG capsule; Take 1 capsule by mouth 2 (Two) Times a Day.  Dispense: 10 capsule; Refill: 0      Any medications prescribed have been sent electronically to   [id number] - [place], [place] - [address]  - [phone]  - [phone] FX  [id number]    [facility] [place] [address]  Phone: [phone] Fax: [phone]        Doug Recinos Sr, MD  03/25/21  11:13 EDT

## 2021-03-25 NOTE — ASSESSMENT & PLAN NOTE
Discussed viral URI's, cause, typical course and treatment options. Discussed that antibiotics do not shorten the duration of viral illnesses. Nasal saline/suction bulb, cool mist humidifier, postural drainage discussed in office today.  Reviewed s/s needing further investigation and those for which to present to ER.

## 2021-03-27 ENCOUNTER — HOSPITAL ENCOUNTER (EMERGENCY)
Facility: HOSPITAL | Age: 41
Discharge: HOME | End: 2021-03-27
Admitting: EMERGENCY MEDICINE

## 2021-03-27 ENCOUNTER — APPOINTMENT (OUTPATIENT)
Dept: CT IMAGING | Facility: HOSPITAL | Age: 41
End: 2021-03-27

## 2021-03-27 ENCOUNTER — APPOINTMENT (OUTPATIENT)
Dept: GENERAL RADIOLOGY | Facility: HOSPITAL | Age: 41
End: 2021-03-27

## 2021-03-27 VITALS
RESPIRATION RATE: 16 BRPM | OXYGEN SATURATION: 100 % | SYSTOLIC BLOOD PRESSURE: 110 MMHG | HEIGHT: 64 IN | BODY MASS INDEX: 32.97 KG/M2 | TEMPERATURE: 97.8 F | DIASTOLIC BLOOD PRESSURE: 70 MMHG | HEART RATE: 66 BPM | WEIGHT: 193.12 LBS

## 2021-03-27 DIAGNOSIS — R11.0 NAUSEA: ICD-10-CM

## 2021-03-27 DIAGNOSIS — R05.9 COUGH: ICD-10-CM

## 2021-03-27 DIAGNOSIS — R10.13 EPIGASTRIC PAIN: Primary | ICD-10-CM

## 2021-03-27 LAB
ALBUMIN SERPL-MCNC: 3.9 G/DL (ref 3.5–5.2)
ALBUMIN/GLOB SERPL: 1.1 G/DL
ALP SERPL-CCNC: 91 U/L (ref 39–117)
ALT SERPL W P-5'-P-CCNC: 10 U/L (ref 1–33)
ANION GAP SERPL CALCULATED.3IONS-SCNC: 11 MMOL/L (ref 5–15)
AST SERPL-CCNC: 10 U/L (ref 1–32)
B PARAPERT DNA SPEC QL NAA+PROBE: NOT DETECTED
B PERT DNA SPEC QL NAA+PROBE: NOT DETECTED
B-HCG UR QL: NEGATIVE
BASOPHILS # BLD AUTO: 0.1 10*3/MM3 (ref 0–0.2)
BASOPHILS NFR BLD AUTO: 0.6 % (ref 0–1.5)
BILIRUB SERPL-MCNC: 0.2 MG/DL (ref 0–1.2)
BUN SERPL-MCNC: 8 MG/DL (ref 6–20)
BUN/CREAT SERPL: 10.8 (ref 7–25)
C PNEUM DNA NPH QL NAA+NON-PROBE: NOT DETECTED
CALCIUM SPEC-SCNC: 9.4 MG/DL (ref 8.6–10.5)
CHLORIDE SERPL-SCNC: 108 MMOL/L (ref 98–107)
CO2 SERPL-SCNC: 24 MMOL/L (ref 22–29)
CREAT SERPL-MCNC: 0.74 MG/DL (ref 0.57–1)
DEPRECATED RDW RBC AUTO: 41.1 FL (ref 37–54)
EOSINOPHIL # BLD AUTO: 0.3 10*3/MM3 (ref 0–0.4)
EOSINOPHIL NFR BLD AUTO: 2.7 % (ref 0.3–6.2)
ERYTHROCYTE [DISTWIDTH] IN BLOOD BY AUTOMATED COUNT: 13.4 % (ref 12.3–15.4)
FLUAV SUBTYP SPEC NAA+PROBE: NOT DETECTED
FLUBV RNA ISLT QL NAA+PROBE: NOT DETECTED
GFR SERPL CREATININE-BSD FRML MDRD: 88 ML/MIN/1.73
GLOBULIN UR ELPH-MCNC: 3.4 GM/DL
GLUCOSE SERPL-MCNC: 88 MG/DL (ref 65–99)
HADV DNA SPEC NAA+PROBE: NOT DETECTED
HCOV 229E RNA SPEC QL NAA+PROBE: NOT DETECTED
HCOV HKU1 RNA SPEC QL NAA+PROBE: NOT DETECTED
HCOV NL63 RNA SPEC QL NAA+PROBE: NOT DETECTED
HCOV OC43 RNA SPEC QL NAA+PROBE: NOT DETECTED
HCT VFR BLD AUTO: 37.7 % (ref 34–46.6)
HGB BLD-MCNC: 12.9 G/DL (ref 12–15.9)
HMPV RNA NPH QL NAA+NON-PROBE: NOT DETECTED
HPIV1 RNA SPEC QL NAA+PROBE: NOT DETECTED
HPIV2 RNA SPEC QL NAA+PROBE: NOT DETECTED
HPIV3 RNA NPH QL NAA+PROBE: NOT DETECTED
HPIV4 P GENE NPH QL NAA+PROBE: NOT DETECTED
LIPASE SERPL-CCNC: 82 U/L (ref 13–60)
LYMPHOCYTES # BLD AUTO: 2.6 10*3/MM3 (ref 0.7–3.1)
LYMPHOCYTES NFR BLD AUTO: 28.1 % (ref 19.6–45.3)
M PNEUMO IGG SER IA-ACNC: NOT DETECTED
MCH RBC QN AUTO: 30.1 PG (ref 26.6–33)
MCHC RBC AUTO-ENTMCNC: 34.3 G/DL (ref 31.5–35.7)
MCV RBC AUTO: 87.7 FL (ref 79–97)
MONOCYTES # BLD AUTO: 0.6 10*3/MM3 (ref 0.1–0.9)
MONOCYTES NFR BLD AUTO: 6.1 % (ref 5–12)
NEUTROPHILS NFR BLD AUTO: 5.9 10*3/MM3 (ref 1.7–7)
NEUTROPHILS NFR BLD AUTO: 62.5 % (ref 42.7–76)
NRBC BLD AUTO-RTO: 0 /100 WBC (ref 0–0.2)
PLATELET # BLD AUTO: 316 10*3/MM3 (ref 140–450)
PMV BLD AUTO: 8.2 FL (ref 6–12)
POTASSIUM SERPL-SCNC: 4 MMOL/L (ref 3.5–5.2)
PROT SERPL-MCNC: 7.3 G/DL (ref 6–8.5)
RBC # BLD AUTO: 4.3 10*6/MM3 (ref 3.77–5.28)
RHINOVIRUS RNA SPEC NAA+PROBE: NOT DETECTED
RSV RNA NPH QL NAA+NON-PROBE: NOT DETECTED
SARS-COV-2 RNA NPH QL NAA+NON-PROBE: NOT DETECTED
SODIUM SERPL-SCNC: 143 MMOL/L (ref 136–145)
TROPONIN T SERPL-MCNC: <0.01 NG/ML (ref 0–0.03)
WBC # BLD AUTO: 9.4 10*3/MM3 (ref 3.4–10.8)

## 2021-03-27 PROCEDURE — 99284 EMERGENCY DEPT VISIT MOD MDM: CPT

## 2021-03-27 PROCEDURE — 83690 ASSAY OF LIPASE: CPT | Performed by: NURSE PRACTITIONER

## 2021-03-27 PROCEDURE — 81025 URINE PREGNANCY TEST: CPT | Performed by: NURSE PRACTITIONER

## 2021-03-27 PROCEDURE — 0 IOPAMIDOL PER 1 ML: Performed by: NURSE PRACTITIONER

## 2021-03-27 PROCEDURE — 85025 COMPLETE CBC W/AUTO DIFF WBC: CPT | Performed by: NURSE PRACTITIONER

## 2021-03-27 PROCEDURE — 74177 CT ABD & PELVIS W/CONTRAST: CPT

## 2021-03-27 PROCEDURE — 0202U NFCT DS 22 TRGT SARS-COV-2: CPT | Performed by: NURSE PRACTITIONER

## 2021-03-27 PROCEDURE — 96374 THER/PROPH/DIAG INJ IV PUSH: CPT

## 2021-03-27 PROCEDURE — 25010000002 ONDANSETRON PER 1 MG: Performed by: NURSE PRACTITIONER

## 2021-03-27 PROCEDURE — 93005 ELECTROCARDIOGRAM TRACING: CPT | Performed by: NURSE PRACTITIONER

## 2021-03-27 PROCEDURE — 71045 X-RAY EXAM CHEST 1 VIEW: CPT

## 2021-03-27 PROCEDURE — 80053 COMPREHEN METABOLIC PANEL: CPT | Performed by: NURSE PRACTITIONER

## 2021-03-27 PROCEDURE — 84484 ASSAY OF TROPONIN QUANT: CPT | Performed by: NURSE PRACTITIONER

## 2021-03-27 RX ORDER — ONDANSETRON 2 MG/ML
4 INJECTION INTRAMUSCULAR; INTRAVENOUS ONCE
Status: DISCONTINUED | OUTPATIENT
Start: 2021-03-27 | End: 2021-03-27

## 2021-03-27 RX ORDER — ONDANSETRON 2 MG/ML
4 INJECTION INTRAMUSCULAR; INTRAVENOUS ONCE
Status: COMPLETED | OUTPATIENT
Start: 2021-03-27 | End: 2021-03-27

## 2021-03-27 RX ORDER — SODIUM CHLORIDE 0.9 % (FLUSH) 0.9 %
10 SYRINGE (ML) INJECTION AS NEEDED
Status: DISCONTINUED | OUTPATIENT
Start: 2021-03-27 | End: 2021-03-27 | Stop reason: HOSPADM

## 2021-03-27 RX ADMIN — IOPAMIDOL 100 ML: 755 INJECTION, SOLUTION INTRAVENOUS at 19:31

## 2021-03-27 RX ADMIN — ONDANSETRON 4 MG: 2 INJECTION INTRAMUSCULAR; INTRAVENOUS at 18:08

## 2021-03-27 RX ADMIN — Medication 10 ML: at 18:08

## 2021-03-27 RX ADMIN — SODIUM CHLORIDE 1000 ML: 9 INJECTION, SOLUTION INTRAVENOUS at 18:06

## 2021-03-28 LAB — QT INTERVAL: 390 MS

## 2021-04-15 ENCOUNTER — TRANSCRIBE ORDERS (OUTPATIENT)
Dept: ADMINISTRATIVE | Facility: HOSPITAL | Age: 41
End: 2021-04-15

## 2021-04-15 ENCOUNTER — OFFICE (OUTPATIENT)
Dept: URBAN - METROPOLITAN AREA CLINIC 64 | Facility: CLINIC | Age: 41
End: 2021-04-15

## 2021-04-15 VITALS
DIASTOLIC BLOOD PRESSURE: 49 MMHG | SYSTOLIC BLOOD PRESSURE: 101 MMHG | WEIGHT: 200 LBS | HEART RATE: 82 BPM | HEIGHT: 64 IN

## 2021-04-15 DIAGNOSIS — R10.13 EPIGASTRIC PAIN: ICD-10-CM

## 2021-04-15 DIAGNOSIS — R11.2 NAUSEA WITH VOMITING, UNSPECIFIED: ICD-10-CM

## 2021-04-15 DIAGNOSIS — R11.2 NAUSEA AND VOMITING, INTRACTABILITY OF VOMITING NOT SPECIFIED, UNSPECIFIED VOMITING TYPE: ICD-10-CM

## 2021-04-15 DIAGNOSIS — R10.13 EPIGASTRIC PAIN: Primary | ICD-10-CM

## 2021-04-15 PROCEDURE — 99243 OFF/OP CNSLTJ NEW/EST LOW 30: CPT | Performed by: NURSE PRACTITIONER

## 2021-04-15 RX ORDER — SUCRALFATE 1 G/1
4 TABLET ORAL
Qty: 120 | Refills: 11 | Status: ACTIVE
Start: 2021-04-15

## 2021-04-21 ENCOUNTER — APPOINTMENT (OUTPATIENT)
Dept: NUCLEAR MEDICINE | Facility: HOSPITAL | Age: 41
End: 2021-04-21

## 2021-04-26 DIAGNOSIS — F32.A ANXIETY AND DEPRESSION: ICD-10-CM

## 2021-04-26 DIAGNOSIS — G43.719 INTRACTABLE CHRONIC MIGRAINE WITHOUT AURA AND WITHOUT STATUS MIGRAINOSUS: ICD-10-CM

## 2021-04-26 DIAGNOSIS — E03.9 HYPOTHYROIDISM, UNSPECIFIED TYPE: ICD-10-CM

## 2021-04-26 DIAGNOSIS — F31.62 BIPOLAR DISORDER, CURRENT EPISODE MIXED, MODERATE (HCC): ICD-10-CM

## 2021-04-26 DIAGNOSIS — F41.9 ANXIETY AND DEPRESSION: ICD-10-CM

## 2021-04-26 RX ORDER — CITALOPRAM 20 MG/1
TABLET ORAL
Qty: 90 TABLET | Refills: 0 | Status: SHIPPED | OUTPATIENT
Start: 2021-04-26 | End: 2021-12-02

## 2021-04-26 RX ORDER — LEVOTHYROXINE SODIUM 125 UG/1
TABLET ORAL
Qty: 30 TABLET | Refills: 0 | Status: SHIPPED | OUTPATIENT
Start: 2021-04-26 | End: 2021-12-02

## 2021-04-26 RX ORDER — QUETIAPINE FUMARATE 50 MG/1
TABLET, FILM COATED ORAL
Qty: 30 TABLET | Refills: 0 | Status: SHIPPED | OUTPATIENT
Start: 2021-04-26 | End: 2021-04-28 | Stop reason: SDUPTHER

## 2021-04-26 RX ORDER — TOPIRAMATE 50 MG/1
TABLET, FILM COATED ORAL
Qty: 30 TABLET | Refills: 0 | Status: SHIPPED | OUTPATIENT
Start: 2021-04-26 | End: 2021-12-02

## 2021-04-28 DIAGNOSIS — F31.62 BIPOLAR DISORDER, CURRENT EPISODE MIXED, MODERATE (HCC): ICD-10-CM

## 2021-04-28 RX ORDER — QUETIAPINE FUMARATE 50 MG/1
25 TABLET, FILM COATED ORAL NIGHTLY
Qty: 30 TABLET | Refills: 0 | Status: SHIPPED | OUTPATIENT
Start: 2021-04-28

## 2021-05-29 DIAGNOSIS — G43.719 INTRACTABLE CHRONIC MIGRAINE WITHOUT AURA AND WITHOUT STATUS MIGRAINOSUS: ICD-10-CM

## 2021-05-31 RX ORDER — SUMATRIPTAN 50 MG/1
50 TABLET, FILM COATED ORAL
Qty: 10 TABLET | Refills: 1 | Status: SHIPPED | OUTPATIENT
Start: 2021-05-31

## 2021-11-29 ENCOUNTER — E-VISIT (OUTPATIENT)
Dept: ADMINISTRATIVE | Facility: OTHER | Age: 41
End: 2021-11-29

## 2021-11-29 ENCOUNTER — E-VISIT (OUTPATIENT)
Dept: FAMILY MEDICINE CLINIC | Facility: TELEHEALTH | Age: 41
End: 2021-11-29

## 2021-11-29 PROCEDURE — BRIGHTMDVISIT: Performed by: NURSE PRACTITIONER

## 2021-11-30 NOTE — E-VISIT ESCALATED
Chief Complaint: Migraine or headache   Patient was shown the following escalation message:   We'd like you to speak with a care provider   Because you're having headaches so often, we'd like you to speak with a provider. A visit with a provider will help determine the best treatment for you.   ----------   Patient Interview Transcript:   Where do you feel your headache pain? Select one.    On both sides of my head (or all over)   Not selected:    On one side of my head (including forehead or back of head)    I'm not sure   How long have you had this headache? Select one.    4 hours to 3 days   Not selected:    Less than 30 minutes    30 minutes to 3 hours    More than 3 days    I don't currently have a headache   How would you describe your headache? Select all that apply.    A feeling of head fullness    It's throbbing or pulsating   Not selected:    Band-like pressure or tightening    Dull pain    Pressure-like pain    It feels like I'm wearing a tight cap    None of the above   On a scale of 1 to 10, how severe is your headache pain? If you have a very severe headache, you may need to be seen in person. Select one.    Moderate (4 to 6)   Not selected:    Mild (1 to 3)    Severe (7 to 9)    Unbearable (10+)    The worst headache of my life   How long did it take for your headache to reach the greatest intensity? The pain of most headaches builds gradually. In rare cases, headache pain comes on suddenly, like a clap of thunder or an explosion, and the pain reaches greatest intensity within 1 minute. Select one.    5 to 30 minutes   Not selected:    Less than 1 minute    1 to 5 minutes    30 to 60 minutes    Longer than 1 hour   Do you currently have any of these symptoms? Select all that apply.    Nausea    Sensitivity to noise    Decreased ability to work, study, or do regular daily activities for at least one day   Not selected:    Vomiting    Sensitivity to light    Headache pain that's worse with physical  "activity (walking or climbing stairs)    None of the above   Are any of these true for your headache? Select all that apply.    It's worse when I bend over    It's worse when I'm upright (and better when I'm lying down)   Not selected:    It wakes me from sleep    It's worse in the morning    None of the above   Do you currently have any of these symptoms? Select all that apply.    Nasal congestion   Not selected:    Fever    Thick yellow or green nasal drainage (mucus)    Pain around the eyes or cheeks    None of these   Do you currently have any of these symptoms? Select all that apply.    None of the above   Not selected:    Confusion    Difficulty staying awake or alert    Difficulty speaking or understanding speech    Difficulty walking    Numbness, tingling, or weakness in any of your limbs lasting longer than 1 hour    Seizures   Headaches can be a sign of a serious underlying condition. Within the last month, have you had any of these symptoms? Select all that apply.    None of the above   Not selected:    Unexplained fever (no clear source or obvious infection)    Recurring night sweats    Severe fatigue that doesn't improve with rest    Unintentional weight loss   Have you had any of these vision changes? Select all that apply.    None of the above   Not selected:    Blurry vision when moving your chin toward your chest    Loss of peripheral vision (can't see out of the corners of the eyes)    Double vision    Severe difficulty seeing    Seeing halos (bright circles) around lights    Complete loss of vision in one eye   Is your headache pain worse with Valsalva maneuvers? A common Valsalva maneuver involves closing your mouth, plugging your nose shut, and breathing out gently in a way that \"pops\" your ears. Bearing down as if having a bowel movement is another example of a Valsalva maneuver. Select one.    No   Not selected:    Yes    I'm not sure   Can you move your chin toward your chest?    Yes   Not " selected:    No, my neck is too stiff   We'd like you to perform three simple tasks: 1. Get up from a seated position without using your arms (or anyone's help) to push yourself up 2. Walk on your toes for 10 steps 3. Walk on your heels for 10 steps Can you perform all three tasks? Select one.    Yes   Not selected:    No, I can't do one or more of the tasks   Do any of these seem to trigger your headache symptoms? Select all that apply.    Changes in sleep pattern    Stress   Not selected:    Alcohol    Artificial sweeteners (aspartame)    Bright lights or visual stimuli    Certain foods, such as cured meats or cheese    Certain smells    Hunger    Weather changes    None of the above   Does your headache only occur during or after intense physical activity (jogging, weight lifting, or having sex)? Select one.    No   Not selected:    Yes    I'm not sure   In the last 2 weeks, have you had any kind of injury or trauma to your head? Examples of injuries might include hitting your head or being hit in the head. Select one.    No   Not selected:    Yes    I'm not sure   In the last month, have you had any known exposure to toxic chemicals? For example, this might include accidentally swallowing antifreeze or inhaling paint fumes. Select one.    No   Not selected:    Yes    I'm not sure   Have you had any recent exposure to carbon monoxide? Common sources of carbon monoxide include motor vehicle exhaust, smoke from fires, engine fumes, and non-electric heaters. Select one.    No   Not selected:    Yes    I'm not sure   Have you ever had a similar headache in the past? By similar, we mean the location of the pain is the same, the headache feels the same, and the associated symptoms are the same. Select one.    Yes   Not selected:    No    I'm not sure   Does it feel like your headaches have gotten worse? For example, are they coming on faster, happening more often, or becoming more painful? Select one.    Yes,  definitely   Not selected:    No, not necessarily    I'm not sure   How have your headaches gotten worse? Select all that apply.    I have more frequent headaches    My headaches are interfering more with my ability to do everyday activities   Not selected:    My headaches are coming on more quickly    My headache pain is getting more intense    None of the above   Over the past month, have you had a headache every day? Select one.    No   Not selected:    Yes    I'm not sure   Over the past 3 months, how many days per month have you had a headache? Select one.    15 or more days per month   Not selected:    1 day or less per month    2 to 14 days per month    I'm not sure   ----------   Medical history   Medical history data does not currently exist for this patient.

## 2021-11-30 NOTE — E-VISIT TREATED
Chief Complaint: Coronavirus (COVID-19), cold, sinus pain, allergy, or flu   Patient introduction   Patient is 41-year-old female who reports congestion, headache, myalgia, and nausea or vomiting that started < 48 hours ago.   Patient has not requested COVID testing.   Coronavirus Disease 2019 (COVID-19) exposure, testing history, and vaccination status:    No known exposure to a confirmed or suspected case of COVID-19.    No recent travel outside of their local community.    Patient had a viral test 15-30 days ago. Test result was negative.    Reports receiving 2 doses.    Received the Pfizer vaccine for the first dose.    Received the Pfizer vaccine for the second dose.    Received their most recent dose of the vaccine > 14 days ago.   Warning. The following may warrant further investigation:    Diabetes    BMI of 30 to 39   When asked why they're seeking care online today, patient reports they want to know if they have a cold or something more serious and need a doctor's note.   Patient requests a 2-day excuse note.   General presentation   Symptoms came on suddenly.   Fever:    Denies fever.   Sinus and nasal symptoms:    Reports postnasal drip.    Reports 1-3 episodes of antibiotic treatment for sinus infection in the last year.    Reports congestion with sinus pain or pressure on or around their nose.    Patient first noticed sinus pain < 5 days ago.    Sinus pain is worse with Valsalva.    Denies rhinitis.    Denies itchy nose or sneezing.    Denies nasal drainage.    Denies history of unhealed nasal septal ulcer/nasal wound.    Denies history of deviated septum or nasal polyps.   Sore throat:    Denies sore throat.   Head and body aches:    Reports headache, described as moderate (4-6 on a scale of 1-10).    Reports myalgia.    Denies sweats.    Denies chills.    Denies fatigue.   Dizziness:    Denies dizziness.   Cough:    Denies cough.   Wheezing and SOB:    Reports previous albuterol inhaler use during  URIs, bronchitis, or pneumonia.    Reports previous steroid inhaler use during URIs, bronchitis, or pneumonia.    Not using an albuterol inhaler for current symptoms because they don't feel they need it.    Denies COPD diagnosis.    Denies asthma diagnosis.    Denies wheezing.    Denies shortness of breath.   Chest pain:    Denies chest pain.   Allergies:    Reports history of allergies.    Patient has known seasonal allergies and known dust allergies.   Flu exposure:    Denies recent exposure to confirmed flu diagnosis.    Denies receiving a flu vaccine this season.   Patient denies the following red flags:    Changes in alertness or awareness    Symptoms suggesting intracranial hemorrhage    Decreased urination   Risk factors for antibiotic resistance:    Diabetes    Antibiotic use for similar symptoms within the last 30 days   Pregnancy/menstrual status/breastfeeding:    Denies being pregnant    Denies breastfeeding    Regarding last menstrual period, patient writes: I am just finishing up my period   Self-exam:    No difficulty moving their chin toward their chest    Neck lymph nodes feel normal   Reports antibiotic treatment for similar symptoms within the past month.   Current medications   Denies taking over-the-counter medication for current symptoms.   Reports taking Aleve.   Medication allergies   None.   Medication contraindication review   Reports history positive for depression. Therefore, the following medication(s) will not be prescribed:    Metoclopramide   Denies history of metoclopramide-associated dystonic reaction and tardive dyskinesia.   No known history of amoxicillin-clavulanate-associated cholestatic jaundice or hepatic impairment.   No known history of azithromycin-associated cholestatic jaundice or hepatic impairment.   Past medical history   Immune conditions: Denies immunocompromising conditions. Denies history of cancer.   Social history   Non-smoker.   Assessment   Acute  nasopharyngitis (common cold). Ruled out: Traumatic laryngitis.   [facility] required information for COVID-19 lab data reporting (if test is ordered):   Symptoms:    Myalgia    Headache    Nasal congestion    Nausea or vomiting   Symptom onset: < 48 hours ago ago  Pregnancy: No or N/A  Healthcare worker? No  Resident in a congregate care setting? No  Previous history of COVID-19 testing: Patient had a viral test 15-30 days ago. Test result was negative.     Plan   Medications:    ibuprofen 200 mg tablet OTC 200mg 2 tabs PO q8h PRN 10d for any fever, pain, or discomfort associated with your condition. Do not exceed 3200mg in a 24-hour period. Amount is 60 tab.   No prescriptions were ordered to treat this patient. The patient selected the following pharmacy during their interview, but no prescriptions were sent:   [facility]   [address] [facility] [address] [facility]   Phone: [phone]     Fax: [phone]   Patient informed to purchase OTC medication.   Other:   Patient was given an excuse note for 2 days.   Education:    Condition and causes    Prevention    Treatment and self-care    When to call provider      ----------   Electronically signed by MAYURI Mejia on 2021-11-29 at 22:42PM   ----------   Patient Interview Transcript:   Why are you getting care through eVisit today? We can't guarantee a specific treatment or test. Your provider will decide what's best for you. You'll have a chance to tell us more at the end of the interview. Select all that apply.    I want to know if I have a cold or something more serious    I need a doctor's note   Not selected:    I want a specific treatment or medication    I want to know if I need to be seen by a provider    I want to be tested for COVID-19    I want to get the COVID-19 vaccine    I think I'm having side effects from the COVID-19 vaccine    I just want to feel better!    None of the above   Which of these symptoms are bothering you? Select all that  apply.    Stuffed-up nose or sinuses    Headache    Muscle or body aches    Nausea or vomiting   Not selected:    Cough    Shortness of breath    Fever    Runny nose    Itchy or watery eyes    Itchy nose or sneezing    Loss of smell or taste    Sore throat    Hoarse voice or loss of voice    Sweats    Chills    Fatigue or tiredness    Diarrhea    I don't have any of these symptoms   Before we learn more about why you're here, we'll get some information related to COVID-19. We'll ask about risk factors, testing, vaccination status, and exposure. Do you have any of these conditions? If so, you may be at increased risk for complications from COVID-19. Select all that apply.    None of the above   Not selected:    Chronic lung disease, such as cystic fibrosis or interstitial fibrosis    Heart disease, such as congenital heart disease, congestive heart failure, or coronary artery disease    Disorder of the brain, spinal cord, or nerves and muscles, such as dementia, cerebral palsy, epilepsy, muscular dystrophy, or developmental delay    Metabolic disorder or mitochondrial disease    Cerebrovascular disease, such as stroke or another condition affecting the blood vessels or blood supply to the brain   Do you live in a group care setting? Examples include: - Nursing home - Residential care - Psychiatric treatment facility - Group home - [facility] - Board and care home - Homeless shelter - Foster care setting Select one.    No   Not selected:    Yes   Have you ever been tested for COVID-19? Select one.    Yes   Not selected:    No   When was your most recent COVID-19 test? Select one.    15 to 30 days ago   Not selected:    Within the last week    7 to 14 days ago    1 to 3 months ago    More than 3 months ago   What type of COVID-19 test did you have? There are 2 types of COVID-19 tests: - Viral tests check if you're currently infected with COVID-19. - Antibody tests check if you've been infected in the past. Select one.    " Viral test for current infection   Not selected:    Antibody test for past infection   What was the result of your most recent COVID-19 test? Select one.    Negative (no sign of infection)   Not selected:    Positive (signs of current or past infection)    I'm not sure   Have you gotten the COVID-19 vaccine? Select one.    Yes   Not selected:    No   How many doses of the COVID-19 vaccine have you gotten? This includes boosters. Select one.    2 doses   Not selected:    1 dose    3 doses   Which COVID-19 vaccine did you get for your first dose? Check your Vaccination Record Card under Product Name/. Select one.    Pfizer-BioNTech (Pfizer)   Not selected:    Max & Max's Julianna Vaccine (J&J/Julianna)    Moderna   Which COVID-19 vaccine did you get for your second dose? Check your Vaccination Record Card under Product Name/. Select one.    Pfizer-BioNTech (Pfizer)   Not selected:    Max & Max's Julianna Vaccine (J&J/Julianna)    Moderna   When did you get your most recent dose of the COVID-19 vaccine?    More than 14 days ago   Not selected:    Less than 48 hours (2 days) ago    48 to 72 hours (3 days) ago    3 to 5 days ago    5 to 7 days ago    7 to 14 days ago   In the last 14 days, have you traveled outside of your local community? This includes travel by car, RV, bus, train, or plane. Travel increases your chances of getting and spreading COVID-19. Select one.    No   Not selected:    Yes   In the last 14 days, have you had close contact with someone who has coronavirus (COVID-19)? \"Close contact\" means any of these: - Living in the same household as someone with COVID-19. - Caring for someone with COVID-19. - Being within 6 feet of someone with COVID-19 for a total of at least 15 minutes over a 24-hour period. For example, three 5-minute exposures for a total of 15 minutes. - Being in direct contact with respiratory droplets from someone with COVID-19 (being coughed on, " "kissing, sharing utensils). Select one.    No, not that I know of   Not selected:    Yes, a confirmed case    Yes, a suspected case   Thanks for completing our COVID-19 questions. Now we'll return to your symptoms. When did your symptoms start? If you know the exact date your symptoms started, choose Other and enter the month and day. Select one.    Less than 48 hours ago   Not selected:    3 to 5 days ago    6 to 9 days ago    10 to 14 days ago    2 to 4 weeks ago    More than a month ago    Other (specify)   Did your symptoms come on suddenly or gradually? Select one.    Suddenly   Not selected:    Gradually    I'm not sure   You mentioned having a headache. On a scale of 1 to 10, how severe is your headache pain? Select one.    Moderate (4 to 6)   Not selected:    Mild (1 to 3)    Severe (7 to 9)    Unbearable (10)    The worst headache of my life (10+)   You mentioned having a stuffy nose or sinus congestion. Do you feel pain or pressure in your sinuses?    Yes   Not selected:    No    I'm not sure   Where do you feel sinus pain or pressure?    Behind my nose   Not selected:    In my forehead    Around my eyes    In my cheeks    In my upper teeth or jaw    I'm not sure   When did you first notice your sinus pain or pressure? Select one.    Less than 5 days ago   Not selected:    5 to 9 days ago    10 to 14 days ago    2 to 4 weeks ago    1 month ago or longer   Does coughing, sneezing, or leaning forward make your sinuses feel worse? Select one.    Yes   Not selected:    No    I'm not sure   What color is your nasal drainage? Select one.    My nose is stuffed but not draining or running   Not selected:    Clear    White    Yellow    Green    I'm not sure   Is there any drainage (mucus) going down the back of your throat? This kind of drainage is also called \"postnasal drip.\" Select one.    Yes   Not selected:    No    I'm not sure   Since your symptoms started, have you felt dizzy? Select one.    No   Not " selected:    Yes, but I can continue with my regular daily activities    Yes, and it makes it hard to stand, walk, or do daily activities   Do you have chest pain? You might also feel it as discomfort, aching, tightness, or squeezing in the chest. Select one.    No   Not selected:    Yes   Have you urinated at least 3 times in the last 24 hours? Select one.    Yes   Not selected:    No    I'm not sure   Changes in alertness or awareness may mean you need emergency care. Since your symptoms started, have you had any of these? Select all that apply.    None of the above   Not selected:    Confusion    Slurred speech    Not knowing where you are or what day it is    Difficulty staying conscious    Fainting or passing out   Do your symptoms include a whistling sound, or wheezing, when you breathe? Select one.    No   Not selected:    Yes    I'm not sure   Do you have any of these symptoms in your ear(s)? Select all that apply.    None of the above   Not selected:    Pain    Pressure    Fullness    Crackling or popping    Plugged or blocked sensation   Can you move your chin toward your chest?    Yes   Not selected:    No, my neck is too stiff   Are your glands/lymph nodes swollen, or does it hurt when you touch them?    No   Not selected:    Yes    I'm not sure   People with a very high body mass index (BMI) are at higher risk for developing complications from the flu and severe illness from COVID-19. To determine your BMI, we need to know your weight and height. Please enter your weight (in pounds).    Weight   Please enter your height.    Height   In the past week, has anyone around you (such as at school, work, or home) had a confirmed diagnosis of the flu? A confirmed diagnosis means that a nose swab was done to verify a flu infection. Select all that apply.    No   Not selected:    I live with someone who has the flu    I've been within touching distance of someone who has the flu    I've walked by, or sat about 3  feet away from, someone who has the flu    I've been in the same building as someone who has the flu    I'm not sure   Have you ever been diagnosed with asthma? Select one.    No   Not selected:    Yes   Have you ever been prescribed albuterol to use for wheezing, cough, or shortness of breath caused by a cold, bronchitis, or pneumonia? Albuterol (ProAir, Proventil, Ventolin) is prescribed as an inhaler or a solution to be used with a nebulizer machine. Select one.    Yes   Not selected:    No    I'm not sure   Have you ever been prescribed a steroid inhaler to use for wheezing, cough, or shortness of breath caused by a cold, bronchitis, or pneumonia? Some examples of steroid inhalers include Pulmicort, Flovent, Qvar, and Alvesco. Select one.    Yes   Not selected:    No    I'm not sure   Have you used albuterol for your current symptoms? This includes both albuterol inhalers and albuterol solution in a nebulizer machine. Select one.    No, I don't feel like I need it   Not selected:    Yes    No, I don't have any, or it's    Would you like a prescription for albuterol? Select one.    No   Not selected:    Yes   Have you ever been diagnosed with chronic obstructive pulmonary disease (COPD)? Select one.    No   Not selected:    Yes    I'm not sure   In the last year, how many times were you treated with antibiotics for a sinus infection? Select one.    1 to 3 times   Not selected:    None    4 or more times   Have you been diagnosed with a deviated septum or nasal polyps? The nose is divided into two nostrils by the septum. A crooked septum is called a deviated septum. Nasal polyps are growths inside the nose or sinuses. Select one.    No   Not selected:    Yes, but I had surgery to treat them    Yes, I have a deviated septum    Yes, I have nasal polyps    Yes, I have a deviated septum and nasal polyps    I'm not sure   Do you have a sore inside your nose that won't heal? Select one.    No   Not selected:     Yes    I'm not sure   Do you have allergies (pollen, dust mites, mold, animal dander)? Select one.    Yes   Not selected:    No    I'm not sure   What kind of allergies do you have? Select all that apply.    Seasonal allergies (hay fever)    Dust allergies   Not selected:    Pet allergies    None of the above    I'm not sure   Do you think your symptoms could be allergy-related? Select one.    I'm not sure   Not selected:    Yes    No   Have you had a flu shot this season? Select one.    No   Not selected:    Yes, less than 2 weeks ago    Yes, 2 to 4 weeks ago    Yes, 1 to 3 months ago    Yes, 3 to 6 months ago    Yes, more than 6 months ago    I'm not sure   The flu and COVID-19 can be more serious for people with certain conditions or characteristics. These questions help us figure out if you or anyone you live with is at higher risk for complications from these infections. Do either of these statements apply to you? Select all that apply.    None of the above   Not selected:    I'm  or Native [place]    I'm a healthcare worker   Do you smoke tobacco? Select one.    No, never   Not selected:    Yes, every day    Yes, some days    No, I quit   Some conditions can put you at risk for more serious infections. Do any of these apply to you? Select all that apply.    I have diabetes   Not selected:    I've been hospitalized within the last 5 days    I'm in close contact with a child in     None of the above   Are you currently being treated for any of these conditions? Scroll to see all options. Select all that apply.    Depression   Not selected:    Aspirin triad (also known as Samter's triad or ASA triad)    Asthma or hives from taking aspirin or other NSAIDs, such as ibuprofen or naproxen    Blockage or narrowing of the blood vessels of the heart    Blood dyscrasia, such anemia, leukemia, lymphoma, or myeloma    Bone marrow depression    Catecholamine-releasing paraganglioma    Blood clotting  disorder    Congenital long QT syndrome    Difficulty urinating or completely emptying your bladder    Uncorrected electrolyte abnormalities    Fungal infection    Gastrointestinal (GI) bleeding    Gastrointestinal (GI) obstruction    G6PD deficiency    Recent heart attack    High blood pressure    Irregular heartbeat or heart rhythm    Kidney disease or hemodialysis    Mononucleosis (mono)    Myasthenia gravis    Parkinson's disease    Pheochromocytoma    Reye syndrome    Seizure disorder    Ulcerative colitis    None of the above   Do you have any of these conditions that can affect the immune system? Scroll to see all options. Select all that apply.    None of these   Not selected:    History of bone marrow transplant    Chronic kidney disease    Chronic liver disease (including cirrhosis)    HIV/AIDS    Inflammatory bowel disease (Crohn's disease or ulcerative colitis)    Lupus    Moderate to severe plaque psoriasis    Multiple sclerosis    Rheumatoid arthritis    Sickle cell anemia    Alpha or beta thalassemia    History of solid organ transplant (kidney, liver, or heart)    History of spleen removal    An autoimmune disorder not listed here    A condition requiring treatment with long-term use of oral steroids (such as prednisone, prednisolone, or dexamethasone)   Have you ever been diagnosed with cancer? Select one.    No   Not selected:    Yes, I have cancer now    Yes, but I'm in remission   Have you ever had either of these conditions? Select all that apply.    No   Not selected:    Metoclopramide-associated dystonic reaction    Tardive dyskinesia   Do any of these apply to the people who live with you? Select all that apply.    None of the above   Not selected:    A child under the age of 5    An adult 65 or older    A person who is pregnant    A person who has given birth, had a miscarriage, had a pregnancy loss, or had an  in the last 2 weeks    An  or Native [place]   Does any  member of your household have any of these medical conditions? Select all that apply.    None of the above   Not selected:    Asthma    Disorders of the brain, spinal cord, or nerves and muscles, such as dementia, cerebral palsy, epilepsy, muscular dystrophy, or developmental delay    Chronic lung disease, such as COPD or cystic fibrosis    Heart disease, such as congenital heart disease, congestive heart failure, or coronary artery disease    Cerebrovascular disease, such as stroke or another condition affecting the blood vessels or blood supply to the brain    Blood disorders, such as sickle cell disease    Diabetes    Metabolic disorders such as inherited metabolic disorders or mitochondrial disease    Kidney disorders    Liver disorders    Weakened immune system due to illness or medications such as chemotherapy or steroids    Children under the age of 19 who are on long-term aspirin therapy    Extreme obesity (BMI > 40)   Are you pregnant? Select one.    No   Not selected:    Yes   When was your last menstrual period? If you don't currently have periods or no longer have periods, please briefly explain.    I am just finishing up my period   Within the last 2 weeks, have you: - Given birth - Had a miscarriage - Had a pregnancy loss - Had an  Being postpartum (live birth or loss) within the last 2 weeks increases your risk of flu complications. Select one.    No   Not selected:    Yes   Are you breastfeeding? Select one.    No   Not selected:    Yes   Just a few more questions about medications, and then you're finished. Have you used any non-prescription medications or nasal sprays for your current symptoms? Examples include saline sprays, decongestants, NyQuil, and Tylenol. Select one.    No   Not selected:    Yes   In the past month, have you taken antibiotics for similar symptoms? Examples of antibiotics include amoxicillin, amoxicillin-clavulanate (Augmentin), penicillin, cefdinir (Omnicef),  doxycycline, and clindamycin (Cleocin). Select one.    Yes   Not selected:    No    I'm not sure   Have you taken any monoamine oxidase inhibitor (MAOI) medications in the last 14 days? Examples include rasagiline (Azilect), selegiline (Eldepryl, Zelapar), isocarboxazid (Marplan), phenelzine (Nardil), and tranylcypromine (Parnate). Select one.    No   Not selected:    Yes    I'm not sure   Do you take Kynmobi or Apokyn (apomorphine)? Select one.    No   Not selected:    Yes    I'm not sure   Are you taking any other medications or supplements? On the next screen, you need to list all vitamins, supplements, non-prescription medications (such as aspirin or Aleve), and prescription medications that you're taking. Select one.    Yes   Not selected:    Yes, but I'm not sure what they are    No   Have you ever had an allergic or bad reaction to any medication? Select one.    No   Not selected:    Yes   Are you allergic to milk or to the proteins found in milk (for example, whey or casein)? A milk allergy is different from lactose intolerance. Select one.    No   Not selected:    Yes    I'm not sure   Have you ever had jaundice or liver problems as a result of taking amoxicillin-clavulanate (Augmentin)? Jaundice is a condition in which the skin and the whites of the eyes turn yellow. Select all that apply.    No, not that I know of   Not selected:    Yes, jaundice    Yes, liver problems   Have you ever had jaundice or liver problems as a result of taking azithromycin (Zithromax, Zmax)? Jaundice is a condition in which the skin and the whites of the eyes turn yellow. Select all that apply.    No, not that I know of   Not selected:    Yes, jaundice    Yes, liver problems   Do you need a doctor's note? A doctor's note confirms that you received care today and states when you can return to school or work. It does not contain information about your diagnosis or treatment plan. Your provider will make the final decision on  whether to give you a doctor's note and for how long. Doctor's notes CANNOT be backdated. We can't provide medical leave paperwork through this type of visit. If more paperwork is needed to request time off, contact your primary care provider. Select one.    Today and tomorrow (2 days)   Not selected:    Today only (1 day)    3 days    7 days    10 days    14 days    No   Is there anything else you'd like to tell us about your symptoms?   The patient did not enter any additional information.   ----------   Medical history   Medical history data does not currently exist for this patient.

## 2021-11-30 NOTE — E-VISIT ESCALATED
Chief Complaint: Migraine or headache   Patient was shown the following escalation message:   We'd like you to speak with a care provider as soon as possible   Because you're having headaches every day, we'd like you to speak with a provider. Visit an urgent care clinic, or make an appointment to be seen in the next 24 hours.   ----------   Patient Interview Transcript:   Where do you feel your headache pain? Select one.    On both sides of my head (or all over)   Not selected:    On one side of my head (including forehead or back of head)    I'm not sure   How long have you had this headache? Select one.    4 hours to 3 days   Not selected:    Less than 30 minutes    30 minutes to 3 hours    More than 3 days    I don't currently have a headache   How would you describe your headache? Select all that apply.    A feeling of head fullness    It's throbbing or pulsating   Not selected:    Band-like pressure or tightening    Dull pain    Pressure-like pain    It feels like I'm wearing a tight cap    None of the above   On a scale of 1 to 10, how severe is your headache pain? If you have a very severe headache, you may need to be seen in person. Select one.    Severe (7 to 9)   Not selected:    Mild (1 to 3)    Moderate (4 to 6)    Unbearable (10+)    The worst headache of my life   How long did it take for your headache to reach the greatest intensity? The pain of most headaches builds gradually. In rare cases, headache pain comes on suddenly, like a clap of thunder or an explosion, and the pain reaches greatest intensity within 1 minute. Select one.    5 to 30 minutes   Not selected:    Less than 1 minute    1 to 5 minutes    30 to 60 minutes    Longer than 1 hour   Do you currently have any of these symptoms? Select all that apply.    Nausea    Sensitivity to noise    Decreased ability to work, study, or do regular daily activities for at least one day   Not selected:    Vomiting    Sensitivity to light    Headache  "pain that's worse with physical activity (walking or climbing stairs)    None of the above   Are any of these true for your headache? Select all that apply.    It's worse when I bend over    It's worse when I'm upright (and better when I'm lying down)   Not selected:    It wakes me from sleep    It's worse in the morning    None of the above   Do you currently have any of these symptoms? Select all that apply.    Nasal congestion   Not selected:    Fever    Thick yellow or green nasal drainage (mucus)    Pain around the eyes or cheeks    None of these   Do you currently have any of these symptoms? Select all that apply.    None of the above   Not selected:    Confusion    Difficulty staying awake or alert    Difficulty speaking or understanding speech    Difficulty walking    Numbness, tingling, or weakness in any of your limbs lasting longer than 1 hour    Seizures   Headaches can be a sign of a serious underlying condition. Within the last month, have you had any of these symptoms? Select all that apply.    Severe fatigue that doesn't improve with rest   Not selected:    Unexplained fever (no clear source or obvious infection)    Recurring night sweats    Unintentional weight loss    None of the above   Have you had any of these vision changes? Select all that apply.    None of the above   Not selected:    Blurry vision when moving your chin toward your chest    Loss of peripheral vision (can't see out of the corners of the eyes)    Double vision    Severe difficulty seeing    Seeing halos (bright circles) around lights    Complete loss of vision in one eye   Is your headache pain worse with Valsalva maneuvers? A common Valsalva maneuver involves closing your mouth, plugging your nose shut, and breathing out gently in a way that \"pops\" your ears. Bearing down as if having a bowel movement is another example of a Valsalva maneuver. Select one.    No   Not selected:    Yes    I'm not sure   Can you move your chin " toward your chest?    Yes   Not selected:    No, my neck is too stiff   We'd like you to perform three simple tasks: 1. Get up from a seated position without using your arms (or anyone's help) to push yourself up 2. Walk on your toes for 10 steps 3. Walk on your heels for 10 steps Can you perform all three tasks? Select one.    Yes   Not selected:    No, I can't do one or more of the tasks   Do any of these seem to trigger your headache symptoms? Select all that apply.    Changes in sleep pattern    Stress   Not selected:    Alcohol    Artificial sweeteners (aspartame)    Bright lights or visual stimuli    Certain foods, such as cured meats or cheese    Certain smells    Hunger    Weather changes    None of the above   Does your headache only occur during or after intense physical activity (jogging, weight lifting, or having sex)? Select one.    No   Not selected:    Yes    I'm not sure   In the last 2 weeks, have you had any kind of injury or trauma to your head? Examples of injuries might include hitting your head or being hit in the head. Select one.    No   Not selected:    Yes    I'm not sure   In the last month, have you had any known exposure to toxic chemicals? For example, this might include accidentally swallowing antifreeze or inhaling paint fumes. Select one.    No   Not selected:    Yes    I'm not sure   Have you had any recent exposure to carbon monoxide? Common sources of carbon monoxide include motor vehicle exhaust, smoke from fires, engine fumes, and non-electric heaters. Select one.    No   Not selected:    Yes    I'm not sure   Have you ever had a similar headache in the past? By similar, we mean the location of the pain is the same, the headache feels the same, and the associated symptoms are the same. Select one.    Yes   Not selected:    No    I'm not sure   Does it feel like your headaches have gotten worse? For example, are they coming on faster, happening more often, or becoming more  painful? Select one.    Yes, definitely   Not selected:    No, not necessarily    I'm not sure   How have your headaches gotten worse? Select all that apply.    I have more frequent headaches    My headaches are interfering more with my ability to do everyday activities   Not selected:    My headaches are coming on more quickly    My headache pain is getting more intense    None of the above   Over the past month, have you had a headache every day? Select one.    Yes   Not selected:    No    I'm not sure   ----------   Medical history   Medical history data does not currently exist for this patient.

## 2021-11-30 NOTE — EXTERNAL PATIENT INSTRUCTIONS
View Doctor's Note     Note   Covid testing is recommended. An order for testing can be placed at a [facility] or you may go to a clinic of your choosing. Claritin-D for congestion as needed.   Diagnosis   Common cold   My name is Heidy Hawthorne, and I'm a healthcare provider at [facility]. I carefully reviewed the symptoms you reported in your interview, and I see that you may have a cold.   With the ongoing COVID-19 pandemic, it can be hard to tell the difference between the common cold and a COVID-19 infection. Many cold symptoms are the same as COVID-19 symptoms. Most people with either illness have mild to moderate symptoms and can rest at home until they get better.   To prevent the spread of illness to others, I recommend that you stay home and away from other people as much as possible while you're sick.   Here are the main things to know about your current symptoms:    Colds get better on their own.    You can use the medications I recommend here to help ease your symptoms.   Based on what you told me in your interview, I haven't prescribed any antibiotics. Antibiotics fight bacteria, not viruses. They don't help when you have a viral infection like the common cold. Antibiotics could even make you feel worse as they can cause or worsen nausea, diarrhea, and stomach pain. The following factors suggest that a virus is causing your symptoms:    You've had symptoms for less than 10 days. A bacterial infection is suspected when you've had symptoms longer than 10 days without improvement.   I've given you a doctor's note for 2 days.   Medications   Non-prescription      Ibuprofen (200mg): Take 2 tablets by mouth every 8 hours as needed for 10 days for any fever, pain, or discomfort associated with your condition. Do not exceed 3200mg in a 24-hour period.   Medications won't cure the cold. However, they may help you feel better while your immune system fights the virus.   About your diagnosis   The  common cold is a viral infection of the respiratory tract, which includes the nose, throat, breathing passages, and lungs. These are the key things to know about colds:    There is no vaccine to prevent colds and no cure for a cold.    Some medications can lessen your symptoms.    You can spread a cold to other people.    Over 200 different viruses can cause the common cold. That's why you can get another cold after you've just had one.   Common symptoms include low-grade fever, sneezing, runny nose, congestion, sore throat, and cough. You may also notice fatigue, body aches, difficulty sleeping, or decreased appetite.   What to expect   You should feel better within 5 to 7 days and be back to normal within 14 days.   You can return to your normal activities when ALL of the following are true:    You've been fever-free for more than 24 hours without using fever-reducing medications such as Tylenol    Your other symptoms have improved    It's been at least 10 days since your symptoms first started   When to seek care   Call us at [phone]   with any sudden or unexpected symptoms.    Fever that measures over 103F or continues for more than 3 days.    Your headache worsens.    Swallowing becomes extremely difficult or impossible.    Hoarse voice or loss of voice lasting longer than 2 weeks.    Your sinus pain continues for 10 days or more, without improvement.    More than 5 episodes of diarrhea in a day.    More than 5 episodes of vomiting in a day.    Coughing up red or bloody mucus.    Severe shortness of breath.    Severe stomach pain.    Symptoms that get better for a few days, and then suddenly get worse.    Severe chest pain   Other treatment    Rest! Your body needs rest to recover and fight the virus.    Drink plenty of water to stay hydrated.    Use a clean humidifier or a cool-mist vaporizer in your room at night. Breathing humid air may help with nasal congestion and cough.    Try non-prescription  saline nasal sprays to help your nasal symptoms.    Try using a Neti Pot to flush out your stuffy nose and sinuses. Net[facility] Pots are available at [facility] without a prescription.    Avoid smoke and air pollution. Smoke can make infections worse.   Prevention    Avoid close contact with other people when you're sick.    Cover your mouth and nose when you cough or sneeze. Use a tissue or cough into your elbow. Make sure that used tissues go directly into the trash.    Avoid touching your eyes, nose, or mouth while you're sick.    Wash your hands often, especially after coughing, sneezing, or blowing your nose. If soap and water are not available, use an alcohol-based hand .    If you or someone in your home or workplace is sick, disinfect commonly used items. This includes door handles, tables, computers, remotes, and pens.    Coronavirus (COVID-19) information   Common symptoms of COVID-19 include fever, cough, shortness of breath, fatigue, muscle or body aches, headaches, new loss of sense of taste or smell, sore throat, stuffy or runny nose, nausea or vomiting, and diarrhea. Most people who get COVID-19 have mild symptoms and can rest at home until they get better. Elderly people and those with chronic medical problems may be at risk for more serious complications.   FAQs about the COVID-19 vaccine   There are three authorized COVID-19 vaccines: Max & Max's Julianna Vaccine (J&J/Julianna), Moderna, and Pfizer-BioNTech (Pfizer). The J&J/Julianna and Moderna vaccines are approved for use in people aged 18 and older. The Pfizer vaccine is approved for those aged 5 and older. All three are available at no cost. Even if you don't have health insurance, you can still get the COVID-19 vaccine for free.   Which vaccine is the best? Which vaccine should I get?   All three vaccines are highly effective. Even if you get COVID after being vaccinated, all of the vaccines help prevent severe disease,  hospitalization, and complications.   Most people should get whichever vaccine is first available to them. However, women younger than 50 years old should consider the rare risk of blood clots with low platelets after vaccination with the J&J/Julianna vaccine. This risk hasn't been seen with the other two vaccines.   Are the vaccines safe?   Yes. Hundreds of millions of people in the [place] have already safely received COVID-19 vaccines. As part of Phase 3 clinical trials in the [place] and other countries, researchers collected safety and efficacy data for all three vaccines. These clinical trials follow strict standards. Before a vaccine is approved, the manufacturing company must submit data to the Food and Drug Administration (FDA) for review. Tens of thousands of volunteers participated in the clinical trials for the vaccines. The FDA continues to monitor safety data as the vaccines are given to the general population.   Do I need the vaccine if I've already had COVID?   Yes. Vaccination helps protect you even if you've already had COVID.   If you had COVID-19 and had symptoms, wait to get vaccinated until ALL of the following are true:    10 days since your symptoms started    24 hours with no fever, without the use of fever-reducing medications    Your other COVID-19 symptoms are improving   If you tested positive for COVID-19 but did not have symptoms, you can get vaccinated after 10 days have passed since you had a positive test, as long as you don't develop symptoms.   If you had COVID-19 and were treated with monoclonal antibodies, you should wait 90 days before getting a COVID-19 vaccination.   How many doses of the vaccine do I need?   J&J/Julianna: one dose.   Moderna: two doses, spaced 4 weeks apart.   Pfizer vaccine: two doses, spaced 3 weeks apart.   When am I considered fully vaccinated?   J&J/Julianna: 14 days after you get the shot.   Moderna: 14 days after your second dose.   Pfizer: 14 days after your  second dose.   What if I miss the second dose of the Moderna or Pfizer vaccine?   Contact your healthcare provider to discuss your options. While one dose of the vaccine may provide some protection against COVID, you need both doses for maximum protection.   What are the common side effects of the vaccine?   A sore arm, tiredness, headache, and muscle pain may occur within two days of getting the vaccine and last a day or two. For the Moderna or Pfizer vaccines, side effects are more common after the second dose. People over the age of 55 are less likely to have side effects than younger people.   After I'm fully vaccinated, can I still get or spread COVID?   Yes, but your disease should be milder, and your risk of serious illness, hospitalization, and complications will be much lower. And being vaccinated reduces the risk of spreading the disease if you get it.   After I'm fully vaccinated, can I go back to normal?    You should still wear a mask indoors in public if:    It's required by laws, rules, regulations, or local guidance.    You have a weakened immune system.    Your age puts you at increased risk of severe disease.    You have a medical condition that puts you at increased risk of severe disease.    Someone in your household has a weakened immune system, is at increased risk for severe disease, or is unvaccinated.    You're in an area of high transmission.    For travel information, see the CDC's latest guidance  .    Even after you're fully vaccinated, you should still:    Get tested and stay away from others if you develop symptoms of COVID-19.    Stay home and away from other private or public settings if you've tested positive for COVID-19 in the previous 10 days.    Continue to follow any applicable laws, rules, and regulations.    If you're exposed to COVID-19 after being fully vaccinated, you should get tested 3 to 5 days after exposure, even if you don't have symptoms. Wear a mask indoors in  "public for 14 days following exposure, or until you've confirmed your test result is negative. If you test positive for COVID-19, you should isolate at home for 10 days.   I'm fully vaccinated but have heard about a \"third dose\" and \"fourth dose.\" Do these apply to me?   Third and fourth doses are needed for some immunocompromised people.   You should get a third dose if ALL of the following are true:    You have a moderately to severely weakened immune system    You've had two doses of the Moderna or Pfizer vaccine    It's been at least 28 days since your second dose   You should get a fourth dose if ALL of the following are true:    You have a moderately to severely weakened immune system    You've had three doses of the Moderna or Pfizer vaccine    It's been at least 6 months since your third dose   If any of these situations apply, you have a moderately to severely weakened immune system:    You're getting active cancer treatment for a cancer or tumor of the blood    You've had an organ transplant and are taking medicine to suppress your immune system    You've had a stem cell transplant within the last 2 years    You're taking medicine to suppress your immune system, such as high-dose corticosteroids    You have moderate to severe primary immunodeficiency (such as DiGeorge syndrome, Wiskott-Mem[place] syndrome)    You have advanced or untreated HIV infection   If you think you need a third or fourth dose, speak with your care team.   I'm fully vaccinated but have heard about \"boosters.\" Do these apply to me?   A booster shot is a \"top-up\" for people whose immunity from vaccination may have lessened over time.   If you got the J&J/Julianna vaccine AND it's been at least 2 months since your shot, you should get a booster shot.   If you got the Pfizer or Moderna vaccine AND it's been at least 6 months since your second dose, you should get a booster shot if:    You're 65 or older    You're 18 or older and live in a " long-term care facility    You're 18 or older and have an underlying medical condition, such as:    Cancer    Chronic kidney disease    Chronic lung disease (COPD, moderate to severe asthma, interstitial lung disease, cystic fibrosis, or pulmonary hypertension)    Dementia or other neurological condition    Diabetes    Down syndrome    Heart condition, including heart failure, coronary artery disease, cardiomyopathies, or hypertension    HIV infection    A weakened immune system    Chronic liver disease    A mental health condition, including depression and schizophrenia spectrum disorders    Obesity    Pregnancy    Sickle cell disease or thalassemia    Smoking, current or former    Solid organ or blood stem cell transplant    Stroke or cerebrovascular disease    Substance use disorder    You're 18 or older and work or live in high-risk settings. This includes:    First responders (healthcare workers, firefighters, police)    Education staff (teachers, support staff,  workers)    Food and agriculture workers    Manufacturing workers    Corrections workers    [employer] workers    Public transit workers    Grocery store workers   If you need a booster shot, you can choose which vaccine to get. You can get the kind you originally received, or you can get a different kind. New CDC recommendations allow for mix-and-match dosing for booster shots. However, women younger than 50 years old should consider the rare risk of blood clots with low platelets after vaccination with the J&J/Julianna vaccine. This risk hasn't been seen with the other two vaccines.   If you think you need a booster shot, speak with your care team.   General information about COVID-19   What should I do if I'm exposed to someone with COVID-19?   In general, you need to be in close contact with someone who has COVID-19 to get infected. Close contact means:    Living in the same household as someone with COVID-19.    Caring for someone  with COVID-19.    Being within 6 feet of someone with COVID-19 for a total of at least 15 minutes over a 24-hour period.    Being in direct contact with respiratory droplets from someone with COVID-19 (for example, being coughed on, kissing, or sharing utensils).   If you're exposed and not fully vaccinated:    Self-quarantine in your home for 14 days after your last known contact with the infected person. Because you can spread the disease before you have symptoms, it's very important that you stay home AT ALL TIMES, unless you need medical care. Don't go to work, school, or public places, including grocery stores and pharmacies. Avoid public transportation, ride-sharing, and taxis.    Watch for the common symptoms of COVID-19: fever, cough, shortness of breath, fatigue, muscle or body aches, headache, new loss of sense of taste or smell, sore throat, stuffy or runny nose, nausea or vomiting, and diarrhea.   What if I develop symptoms of COVID-19?   CALL your healthcare provider or clinic right away to discuss next steps if you have any of the following risk factors:    Age 65 or older    Pregnant    Chronic medical condition such as diabetes, liver disease, kidney disease requiring dialysis, heart disease, high blood pressure, severe obesity, or lung disease (including moderate to severe asthma)    A medical condition that affects your immune system    Taking a medication that affects your immune system   Otherwise, if your symptoms are mild, you don't need to call your healthcare provider or be seen for an exam. You can recover at home and should feel better within a few weeks. Because COVID-19 is highly contagious, it's important that you avoid close contact with others while you're recovering. This means staying home AT ALL TIMES, unless you need medical care. Don't go to work, school, or public places, including grocery stores and pharmacies. Avoid public transportation, ride-sharing, and taxis.   There are  currently no specific medications to treat this infection. Over-the-counter cold medications can help ease symptoms.   To prevent the spread of COVID-19 to the people and animals in your household:    Stay in a specific room away from other people in your home, and use a separate bathroom if possible.    Wear a mask when close contact with household members can't be avoided.   You can return to your normal activities when ALL of the following are true:    Your symptoms have improved    It's been at least 10 days since your symptoms first started    You've been fever-free for more than 24 hours without using fever-reducing medications such as Tylenol   When to get care   Call your healthcare provider immediately if you have any of the following:    Fever over 103F    Fever that doesn't come down after taking medications such as Tylenol or ibuprofen    Fever that returns after being gone for more than 24 hours    Fever lasting more than 4 days    Worsening shortness of breath or difficulty breathing   Go to your nearest ER or call 911 if you have any of the following:    Shortness of breath that makes it hard to do simple things like get dressed, bathe, or comb your hair    Persistent chest pain or chest tightness    New confusion or difficulty staying alert    Bluish color to the lips or face    Flu vaccine information   Getting a flu vaccine this year is more important than ever. The vaccine not only protects you and the people around you from the flu, it also helps reduce the strain on healthcare systems responding to the COVID-19 pandemic.   Who should get a flu vaccine?   Everyone 6 months of age and older should get a yearly flu vaccine.   When should I get vaccinated?   You should get a flu vaccine by the end of October. Once you're vaccinated, it takes about two weeks for antibodies to develop and protect you against the flu. That's why it's important to get vaccinated as soon as possible.   After October, is  it too late to get vaccinated?   No. You should still get vaccinated. As long as the flu viruses are still in your community, flu vaccines will remain available, even into January of next year or later.   Why do I need a flu vaccine EVERY year?   Flu viruses are constantly changing, so flu vaccines are usually updated from one season to the next. Your protection from the flu vaccine also lessens over time.   Is the flu vaccine safe?   Yes. Over the last 50 years, hundreds of millions of Americans have safely received the flu vaccines.   What are the side effects of flu vaccines?   You CANNOT get the flu from a flu vaccine. Common side effects of the flu shot include soreness, redness and/or swelling where the shot was given, low grade fever, and aches. Common side effects of the nasal spray flu vaccine for adults include runny nose, headaches, sore throat, and cough. For children, side effects include wheezing, vomiting, muscle aches, and fever.   Does the flu vaccine increase your risk of getting COVID-19?   No. There is no evidence that getting a flu vaccine increases your risk of getting COVID-19.   Is it safe to get the flu vaccine along with a COVID-19 vaccine?   Yes. It's safe to get the flu vaccine with a COVID vaccine or booster.   Contact your healthcare provider TODAY for details on when and where to get your flu vaccine.   Your provider   Your diagnosis was provided by Heidy Hawthorne, a member of your trusted care team at [facility].   If you have any questions, call us at [phone]  .   View Doctor's Note     Expires on 12/30/21

## 2021-12-01 ENCOUNTER — TELEPHONE (OUTPATIENT)
Dept: FAMILY MEDICINE CLINIC | Facility: CLINIC | Age: 41
End: 2021-12-01

## 2021-12-01 NOTE — TELEPHONE ENCOUNTER
Caller:   Best call back number:  Roseanna Charles (Self) [phone] (H)       Chief complaint:  PATIENT STATED THAT SHE IS VERY NAUSEOUS AND VOMITING TODAY  ACHY ALL OVER WITH SEVERE HEADACHE   PATIENT STATED THAT SHE WAS HAVING PRESSURE IN HER CHEST       Patient directed to call 911 or go to their nearest emergency room.     Patient verbalized understanding: [x] Yes  [] No  If no, why?

## 2021-12-02 DIAGNOSIS — G43.719 INTRACTABLE CHRONIC MIGRAINE WITHOUT AURA AND WITHOUT STATUS MIGRAINOSUS: ICD-10-CM

## 2021-12-02 DIAGNOSIS — F41.9 ANXIETY AND DEPRESSION: ICD-10-CM

## 2021-12-02 DIAGNOSIS — F32.A ANXIETY AND DEPRESSION: ICD-10-CM

## 2021-12-02 DIAGNOSIS — E03.9 HYPOTHYROIDISM, UNSPECIFIED TYPE: ICD-10-CM

## 2021-12-02 RX ORDER — CITALOPRAM 20 MG/1
TABLET ORAL
Qty: 90 TABLET | Refills: 0 | Status: SHIPPED | OUTPATIENT
Start: 2021-12-02

## 2021-12-02 RX ORDER — LEVOTHYROXINE SODIUM 125 UG/1
TABLET ORAL
Qty: 30 TABLET | Refills: 0 | Status: SHIPPED | OUTPATIENT
Start: 2021-12-02

## 2021-12-02 RX ORDER — TOPIRAMATE 50 MG/1
TABLET, FILM COATED ORAL
Qty: 30 TABLET | Refills: 0 | Status: SHIPPED | OUTPATIENT
Start: 2021-12-02

## 2021-12-06 ENCOUNTER — OFFICE VISIT (OUTPATIENT)
Dept: FAMILY MEDICINE CLINIC | Facility: CLINIC | Age: 41
End: 2021-12-06

## 2021-12-06 ENCOUNTER — HOSPITAL ENCOUNTER (OUTPATIENT)
Dept: GENERAL RADIOLOGY | Facility: HOSPITAL | Age: 41
Discharge: HOME | End: 2021-12-06
Admitting: FAMILY MEDICINE

## 2021-12-06 VITALS
TEMPERATURE: 97.8 F | DIASTOLIC BLOOD PRESSURE: 72 MMHG | OXYGEN SATURATION: 100 % | RESPIRATION RATE: 18 BRPM | HEART RATE: 68 BPM | BODY MASS INDEX: 34.85 KG/M2 | WEIGHT: 204.13 LBS | SYSTOLIC BLOOD PRESSURE: 116 MMHG | HEIGHT: 64 IN

## 2021-12-06 DIAGNOSIS — T16.9XXA FOREIGN BODY IN EAR, UNSPECIFIED LATERALITY, INITIAL ENCOUNTER: ICD-10-CM

## 2021-12-06 DIAGNOSIS — J18.9 MULTIFOCAL PNEUMONIA: Primary | ICD-10-CM

## 2021-12-06 DIAGNOSIS — E66.01 CLASS 2 SEVERE OBESITY DUE TO EXCESS CALORIES WITH SERIOUS COMORBIDITY AND BODY MASS INDEX (BMI) OF 35.0 TO 35.9 IN ADULT (HCC): ICD-10-CM

## 2021-12-06 PROCEDURE — 99214 OFFICE O/P EST MOD 30 MIN: CPT | Performed by: FAMILY MEDICINE

## 2021-12-06 PROCEDURE — 71046 X-RAY EXAM CHEST 2 VIEWS: CPT

## 2021-12-06 RX ORDER — ONDANSETRON 4 MG/1
TABLET, ORALLY DISINTEGRATING ORAL
COMMUNITY
Start: 2021-11-22 | End: 2022-02-11

## 2021-12-06 RX ORDER — PREDNISONE 20 MG/1
TABLET ORAL
Qty: 11 TABLET | Refills: 0 | Status: SHIPPED | OUTPATIENT
Start: 2021-12-06 | End: 2021-12-16

## 2021-12-06 RX ORDER — OFLOXACIN 3 MG/ML
5 SOLUTION AURICULAR (OTIC) 2 TIMES DAILY
Qty: 10 ML | Refills: 0 | Status: SHIPPED | OUTPATIENT
Start: 2021-12-06 | End: 2021-12-13

## 2021-12-06 RX ORDER — DOXYCYCLINE 100 MG/1
100 CAPSULE ORAL 2 TIMES DAILY
Qty: 10 CAPSULE | Refills: 0 | Status: SHIPPED | OUTPATIENT
Start: 2021-12-06 | End: 2021-12-11

## 2021-12-06 RX ORDER — DOXYCYCLINE HYCLATE 100 MG/1
CAPSULE ORAL
COMMUNITY
Start: 2021-12-01 | End: 2021-12-16

## 2021-12-06 RX ORDER — ALBUTEROL SULFATE 90 UG/1
2 AEROSOL, METERED RESPIRATORY (INHALATION) EVERY 4 HOURS PRN
Qty: 8 G | Refills: 0 | Status: SHIPPED | OUTPATIENT
Start: 2021-12-06

## 2021-12-06 NOTE — PROGRESS NOTES
"Chief Complaint   Patient presents with   • Follow-up   • Cough       Subjective   Roseanna Charles is a 41 y.o. female.     Patient diagnosed with multifocal pneumonia by the ER on 12/1/2021.  She continues to feel \"the same.\"  Symptoms include excessive fatigue, headache, nausea, chest discomfort, dyspnea on exertion, myalgias, and nonproductive cough.  COVID negative.  Nasal congestion has gotten better.  Prescribed doxycycline (has one more dosage left).  No longer having diaphoresis.  No fever (she often has chills).    Cough  This is a new problem. The current episode started in the past 7 days. The problem has been gradually improving. The problem occurs every few minutes. The cough is productive of sputum. Associated symptoms include headaches, rhinorrhea, shortness of breath and wheezing. Pertinent negatives include no chills or fever. Nothing aggravates the symptoms. Treatments tried: antibiotic. The treatment provided moderate relief. Her past medical history is significant for environmental allergies and pneumonia.        Answers for HPI/ROS submitted by the patient on 12/6/2021  Please describe your symptoms.: I am being seen for a hospital followup due to pneumonia.  I have been on doxycycline for 5 days now. I am still experiencing a lot of fatigue, headache, uncomfortable feeling in my chest, nausea. The e.r. told me I need to have a second chest xray visit when getting my follow up done.  Have you had these symptoms before?: No  How long have you been having these symptoms?: 1-2 weeks  Please list any medications you are currently taking for this condition.: Doxycycline  Please describe any probable cause for these symptoms. : I don't know the cause.  What is the primary reason for your visit?: Other        Past Medical History :  Active Ambulatory Problems     Diagnosis Date Noted   • Anxiety and depression 07/01/2019   • Migraine 08/09/2016   • Obstructive sleep apnea 08/09/2016   • Anemia " 07/12/2019   • Diabetes (HCC) 07/12/2019   • Hypothyroid 07/12/2019   • Knee pain, right 07/12/2019   • Malaise and fatigue 07/12/2019   • Migraine without aura and without status migrainosus, not intractable 07/12/2019   • Annual physical exam 08/10/2019   • Essential hypertension 08/10/2019   • Right hip pain 08/10/2019   • Right upper quadrant pain 03/19/2020   • Bacterial respiratory infection 06/01/2020   • Bipolar disorder, current episode mixed, moderate (HCC) 07/02/2020     Resolved Ambulatory Problems     Diagnosis Date Noted   • Flu-like symptoms 07/30/2020   • Viral upper respiratory tract infection 07/31/2020   • Fever 11/06/2020   • Non-recurrent acute serous otitis media of right ear 12/09/2020   • Symptoms of upper respiratory infection (URI) 03/18/2021     Past Medical History:   Diagnosis Date   • Abdominal pain    • Acute non-recurrent maxillary sinusitis    • Allergic    • Bacterial vaginosis    • Chicken pox    • History of ulcer disease    • Overweight (BMI 25.0-29.9)    • Pneumonia 12/1/21   • Upper respiratory infection, acute        Medication List:    Current Outpatient Medications:   •  citalopram (CeleXA) 20 MG tablet, Take 1 tablet by mouth once daily, Disp: 90 tablet, Rfl: 0  •  diclofenac (VOLTAREN) 50 MG EC tablet, Take 1 tablet by mouth 2 (Two) Times a Day As Needed (pain)., Disp: 60 tablet, Rfl: 1  •  dicyclomine (Bentyl) 20 MG tablet, Take 1 tablet by mouth Every 6 (Six) Hours., Disp: 60 tablet, Rfl: 0  •  doxycycline (VIBRAMYCIN) 100 MG capsule, , Disp: , Rfl:   •  Euthyrox 125 MCG tablet, Take 1 tablet by mouth once daily, Disp: 30 tablet, Rfl: 0  •  fluticasone (Flonase) 50 MCG/ACT nasal spray, 2 sprays into the nostril(s) as directed by provider Daily. 2 puffs each nostril, Disp: 1 bottle, Rfl: 0  •  Loratadine (Claritin) 10 MG capsule, Take 10 mg by mouth Daily As Needed (congestion)., Disp: 30 each, Rfl: 0  •  metFORMIN (GLUCOPHAGE) 500 MG tablet, TAKE 1 TABLET BY MOUTH TWICE  "DAILY WITH MEALS, Disp: 180 tablet, Rfl: 1  •  ondansetron (ZOFRAN) 4 MG tablet, Take 1 tablet by mouth Every 8 (Eight) Hours As Needed for Nausea or Vomiting., Disp: 30 tablet, Rfl: 0  •  ondansetron ODT (ZOFRAN-ODT) 4 MG disintegrating tablet, , Disp: , Rfl:   •  QUEtiapine (SEROquel) 50 MG tablet, Take 0.5 tablets by mouth Every Night. Patient can take this medication Per MD as she has been on this combination, Disp: 30 tablet, Rfl: 0  •  SUMAtriptan (IMITREX) 50 MG tablet, Take 1 tablet by mouth Every 2 (Two) Hours As Needed for Migraine (Max is 200mg a day.). 1 tab at onset of headache. Repeat 1time in 2 hrs, Disp: 10 tablet, Rfl: 1  •  topiramate (TOPAMAX) 50 MG tablet, Take 1 tablet by mouth once daily, Disp: 30 tablet, Rfl: 0    No Known Allergies    Social History     Tobacco Use   • Smoking status: Never Smoker   • Smokeless tobacco: Never Used   Substance Use Topics   • Alcohol use: Yes     Alcohol/week: 0.0 standard drinks     Comment: A few times per year       Review of Systems   Constitutional: Positive for fatigue. Negative for chills and fever.   HENT: Positive for congestion and rhinorrhea. Negative for trouble swallowing.    Respiratory: Positive for cough, chest tightness, shortness of breath and wheezing.    Gastrointestinal: Negative for abdominal pain, diarrhea and vomiting.   Genitourinary: Negative for decreased urine volume and difficulty urinating.   Allergic/Immunologic: Positive for environmental allergies.   Neurological: Negative for syncope.         Objective   Vitals:    12/06/21 1426   BP: 116/72   BP Location: Right arm   Patient Position: Sitting   Cuff Size: Adult   Pulse: 68   Resp: 18   Temp: 97.8 °F (36.6 °C)   TempSrc: Temporal   SpO2: 100%   Weight: 92.6 kg (204 lb 2 oz)   Height: 162.6 cm (64\")     Body mass index is 35.04 kg/m².    Physical Exam  Constitutional:       General: She is not in acute distress.     Appearance: Normal appearance. She is well-developed. She is " obese. She is not ill-appearing, toxic-appearing or diaphoretic.   HENT:      Head: Normocephalic and atraumatic.      Right Ear: External ear normal.      Left Ear: External ear normal.      Ears:      Comments: Foreign body bilateral ears - insects noted in both ears.  Irrigation attempted.  Successful removal of dead insect from right canal.  Left canal has residual insect.  Canal and TM red after irrigation.  Eyes:      General: No scleral icterus.        Right eye: No discharge.         Left eye: No discharge.      Extraocular Movements: Extraocular movements intact.      Conjunctiva/sclera: Conjunctivae normal.   Cardiovascular:      Rate and Rhythm: Normal rate and regular rhythm.      Heart sounds: Normal heart sounds. No murmur heard.      Pulmonary:      Effort: Pulmonary effort is normal.      Breath sounds: Examination of the right-upper field reveals wheezing. Examination of the left-upper field reveals wheezing. Examination of the right-lower field reveals wheezing. Examination of the left-lower field reveals wheezing. Decreased breath sounds (tightness) and wheezing present.   Musculoskeletal:         General: No swelling.      Cervical back: Normal range of motion and neck supple.      Right lower leg: No edema.      Left lower leg: No edema.   Skin:     General: Skin is warm.      Capillary Refill: Capillary refill takes less than 2 seconds.      Findings: No rash.   Neurological:      General: No focal deficit present.      Mental Status: She is alert.      Cranial Nerves: No cranial nerve deficit.       XR Chest 2 View    Result Date: 12/6/2021  Impression: No active cardiopulmonary disease  Electronically Signed By-Micah Hughes On:12/6/2021 4:20 PM This report was finalized on [id number] by  Micah Hughes, .      Assessment/Plan     Diagnoses and all orders for this visit:    1. Multifocal pneumonia (Primary)  -     predniSONE (DELTASONE) 20 MG tablet; Take 1 tablet twice daily for 3 days  then 1 tablet daily for 3 days then 1/2 tablet daily for 4 days then stop.  Dispense: 11 tablet; Refill: 0  -     doxycycline (MONODOX) 100 MG capsule; Take 1 capsule by mouth 2 (Two) Times a Day for 5 days.  Dispense: 10 capsule; Refill: 0  -     albuterol sulfate  (90 Base) MCG/ACT inhaler; Inhale 2 puffs Every 4 (Four) Hours As Needed for Wheezing or Shortness of Air.  Dispense: 8 g; Refill: 0  -     XR Chest 2 View    2. Foreign body in ear, unspecified laterality, initial encounter  -     ofloxacin (FLOXIN) 0.3 % otic solution; Administer 5 drops into both ears 2 (Two) Times a Day for 7 days.  Dispense: 10 mL; Refill: 0    3. Class 2 severe obesity due to excess calories with serious comorbidity and body mass index (BMI) of 35.0 to 35.9 in adult (HCC)    CXR improving, clinically still symptomatic.  Will extend doxycycline dosage by 5 more days.  Add oral prednisone.  COVID negative per ER records.    F/U on Friday for recheck.  FMLA forms completed with estimated return to work date Monday (one week).    Medication and medication adverse effects discussed.  Drug education given and explained to patient. Patient verbalized understanding.  All questions presented by patient addressed.    Return in about 4 days (around 12/10/2021) for Recheck.       Patient was given instructions and counseling regarding his/her condition or for health maintenance advice. Please see specific information pulled into the AVS if appropriate.     I wore protective equipment throughout this patient encounter to include mask. Hand hygiene was performed before donning protective equipment and after removal when leaving the room.

## 2021-12-10 ENCOUNTER — OFFICE VISIT (OUTPATIENT)
Dept: FAMILY MEDICINE CLINIC | Facility: CLINIC | Age: 41
End: 2021-12-10

## 2021-12-10 VITALS
DIASTOLIC BLOOD PRESSURE: 60 MMHG | HEIGHT: 64 IN | HEART RATE: 77 BPM | RESPIRATION RATE: 16 BRPM | BODY MASS INDEX: 35.41 KG/M2 | OXYGEN SATURATION: 98 % | WEIGHT: 207.4 LBS | TEMPERATURE: 97.1 F | SYSTOLIC BLOOD PRESSURE: 119 MMHG

## 2021-12-10 DIAGNOSIS — E66.01 CLASS 2 SEVERE OBESITY DUE TO EXCESS CALORIES WITH SERIOUS COMORBIDITY AND BODY MASS INDEX (BMI) OF 35.0 TO 35.9 IN ADULT (HCC): ICD-10-CM

## 2021-12-10 DIAGNOSIS — J18.9 MULTIFOCAL PNEUMONIA: Primary | ICD-10-CM

## 2021-12-10 DIAGNOSIS — T16.2XXD FOREIGN BODY OF LEFT EAR, SUBSEQUENT ENCOUNTER: ICD-10-CM

## 2021-12-10 PROCEDURE — 99213 OFFICE O/P EST LOW 20 MIN: CPT | Performed by: FAMILY MEDICINE

## 2021-12-10 NOTE — PROGRESS NOTES
Chief Complaint   Patient presents with   • Pneumonia       Subjective   Roseanna Charles is a 41 y.o. female.     Patient diagnosed with multifocal pneumonia by the ER on 12/1/2021.  She is here for recheck.  She continues to feel about the same.    CXR from Monday showed improvement in pneumonia.  Treatment provided on Monday included extension of oral antibiotic doxycycline and addition of prednisone.      She reports no fever.  Tolerating medications.  Still with fatigue, chest tightness, wheezing.       Answers for HPI/ROS submitted by the patient on 12/10/2021  Please describe your symptoms.: Follow up for previous pneumonia followup  Have you had these symptoms before?: No  How long have you been having these symptoms?: 1-2 weeks  Please list any medications you are currently taking for this condition.: Albuterol inhaler as needed, Doxycycline , Prednisone  Please describe any probable cause for these symptoms. : Unsure  What is the primary reason for your visit?: Other      Past Medical History :  Active Ambulatory Problems     Diagnosis Date Noted   • Anxiety and depression 07/01/2019   • Migraine 08/09/2016   • Obstructive sleep apnea 08/09/2016   • Anemia 07/12/2019   • Diabetes (HCC) 07/12/2019   • Hypothyroid 07/12/2019   • Knee pain, right 07/12/2019   • Malaise and fatigue 07/12/2019   • Migraine without aura and without status migrainosus, not intractable 07/12/2019   • Annual physical exam 08/10/2019   • Essential hypertension 08/10/2019   • Right hip pain 08/10/2019   • Right upper quadrant pain 03/19/2020   • Bacterial respiratory infection 06/01/2020   • Bipolar disorder, current episode mixed, moderate (HCC) 07/02/2020     Resolved Ambulatory Problems     Diagnosis Date Noted   • Flu-like symptoms 07/30/2020   • Viral upper respiratory tract infection 07/31/2020   • Fever 11/06/2020   • Non-recurrent acute serous otitis media of right ear 12/09/2020   • Symptoms of upper respiratory infection  (URI) 03/18/2021     Past Medical History:   Diagnosis Date   • Abdominal pain    • Acute non-recurrent maxillary sinusitis    • Allergic    • Bacterial vaginosis    • Chicken pox    • History of ulcer disease    • Overweight (BMI 25.0-29.9)    • Pneumonia 12/1/21   • Upper respiratory infection, acute        Medication List:    Current Outpatient Medications:   •  albuterol sulfate  (90 Base) MCG/ACT inhaler, Inhale 2 puffs Every 4 (Four) Hours As Needed for Wheezing or Shortness of Air., Disp: 8 g, Rfl: 0  •  citalopram (CeleXA) 20 MG tablet, Take 1 tablet by mouth once daily, Disp: 90 tablet, Rfl: 0  •  diclofenac (VOLTAREN) 50 MG EC tablet, Take 1 tablet by mouth 2 (Two) Times a Day As Needed (pain)., Disp: 60 tablet, Rfl: 1  •  dicyclomine (Bentyl) 20 MG tablet, Take 1 tablet by mouth Every 6 (Six) Hours., Disp: 60 tablet, Rfl: 0  •  doxycycline (MONODOX) 100 MG capsule, Take 1 capsule by mouth 2 (Two) Times a Day for 5 days., Disp: 10 capsule, Rfl: 0  •  doxycycline (VIBRAMYCIN) 100 MG capsule, , Disp: , Rfl:   •  Euthyrox 125 MCG tablet, Take 1 tablet by mouth once daily, Disp: 30 tablet, Rfl: 0  •  fluticasone (Flonase) 50 MCG/ACT nasal spray, 2 sprays into the nostril(s) as directed by provider Daily. 2 puffs each nostril, Disp: 1 bottle, Rfl: 0  •  Loratadine (Claritin) 10 MG capsule, Take 10 mg by mouth Daily As Needed (congestion)., Disp: 30 each, Rfl: 0  •  metFORMIN (GLUCOPHAGE) 500 MG tablet, TAKE 1 TABLET BY MOUTH TWICE DAILY WITH MEALS, Disp: 180 tablet, Rfl: 1  •  ofloxacin (FLOXIN) 0.3 % otic solution, Administer 5 drops into both ears 2 (Two) Times a Day for 7 days., Disp: 10 mL, Rfl: 0  •  ondansetron (ZOFRAN) 4 MG tablet, Take 1 tablet by mouth Every 8 (Eight) Hours As Needed for Nausea or Vomiting., Disp: 30 tablet, Rfl: 0  •  ondansetron ODT (ZOFRAN-ODT) 4 MG disintegrating tablet, , Disp: , Rfl:   •  predniSONE (DELTASONE) 20 MG tablet, Take 1 tablet twice daily for 3 days then 1  "tablet daily for 3 days then 1/2 tablet daily for 4 days then stop., Disp: 11 tablet, Rfl: 0  •  QUEtiapine (SEROquel) 50 MG tablet, Take 0.5 tablets by mouth Every Night. Patient can take this medication Per MD as she has been on this combination, Disp: 30 tablet, Rfl: 0  •  SUMAtriptan (IMITREX) 50 MG tablet, Take 1 tablet by mouth Every 2 (Two) Hours As Needed for Migraine (Max is 200mg a day.). 1 tab at onset of headache. Repeat 1time in 2 hrs, Disp: 10 tablet, Rfl: 1  •  topiramate (TOPAMAX) 50 MG tablet, Take 1 tablet by mouth once daily, Disp: 30 tablet, Rfl: 0    No Known Allergies    Social History     Tobacco Use   • Smoking status: Never Smoker   • Smokeless tobacco: Never Used   Substance Use Topics   • Alcohol use: Yes     Alcohol/week: 0.0 standard drinks     Comment: A few times per year       Review of Systems   Constitutional: Positive for fatigue. Negative for chills and fever.   HENT: Positive for congestion and rhinorrhea. Negative for trouble swallowing.    Respiratory: Positive for cough, chest tightness, shortness of breath and wheezing.    Gastrointestinal: Negative for abdominal pain, diarrhea and vomiting.   Genitourinary: Negative for decreased urine volume and difficulty urinating.   Allergic/Immunologic: Positive for environmental allergies.   Neurological: Negative for syncope.         Objective   Vitals:    12/10/21 0829   BP: 119/60   Pulse: 77   Resp: 16   Temp: 97.1 °F (36.2 °C)   TempSrc: Temporal   SpO2: 98%   Weight: 94.1 kg (207 lb 6.4 oz)   Height: 162.6 cm (64\")     Body mass index is 35.6 kg/m².    Physical Exam  Constitutional:       General: She is not in acute distress.     Appearance: Normal appearance. She is well-developed. She is obese. She is not ill-appearing, toxic-appearing or diaphoretic.   HENT:      Head: Normocephalic and atraumatic.      Right Ear: External ear normal.      Left Ear: External ear normal.      Ears:      Comments: Right canal clear.  Left canal " notable for presence of foreign body (dead insect).  Canal not inflamed.  Eyes:      General: No scleral icterus.        Right eye: No discharge.         Left eye: No discharge.      Extraocular Movements: Extraocular movements intact.      Conjunctiva/sclera: Conjunctivae normal.   Cardiovascular:      Rate and Rhythm: Normal rate and regular rhythm.      Heart sounds: Normal heart sounds. No murmur heard.      Pulmonary:      Effort: Pulmonary effort is normal.      Breath sounds: Examination of the right-upper field reveals wheezing. Examination of the left-upper field reveals wheezing. Examination of the right-lower field reveals wheezing. Examination of the left-lower field reveals wheezing. Decreased breath sounds (tightness) and wheezing present.      Comments: There is improvement in her breath sounds on recheck today as compared to Monday.  Improved air movement, wheezing not as severe.  Musculoskeletal:         General: No swelling.      Cervical back: Normal range of motion and neck supple.      Right lower leg: No edema.      Left lower leg: No edema.   Skin:     General: Skin is warm.      Capillary Refill: Capillary refill takes less than 2 seconds.      Findings: No rash.   Neurological:      General: No focal deficit present.      Mental Status: She is alert.      Cranial Nerves: No cranial nerve deficit.       XR Chest 2 View    Result Date: 12/6/2021  Impression: No active cardiopulmonary disease  Electronically Signed By-Micah Hughes On:12/6/2021 4:20 PM This report was finalized on [id number] by  Micah Hughes, .      Assessment/Plan     Diagnoses and all orders for this visit:    1. Multifocal pneumonia (Primary)    2. Foreign body of left ear, subsequent encounter    3. Class 2 severe obesity due to excess calories with serious comorbidity and body mass index (BMI) of 35.0 to 35.9 in adult (HCC)    Clinically, patient appears to be improving.  CXR no longer showing infiltration.  Breath  sounds improved on exam today.  However, due to ongoing symptoms and risk of symptoms worsening, I will have her remain off of work until recheck in the office next week.  She works at [employer] in a cold environment, and this will likely exacerbate her current symptoms.       She will continue to use ear drops in the left canal and plan for repeat irrigation next week if foreign body still present.    Medication and medication adverse effects discussed.  Drug education given and explained to patient. Patient verbalized understanding.  All questions presented by patient addressed.    Return in about 6 days (around 12/16/2021) for Recheck.       Patient was given instructions and counseling regarding his/her condition or for health maintenance advice. Please see specific information pulled into the AVS if appropriate.     I wore protective equipment throughout this patient encounter to include mask. Hand hygiene was performed before donning protective equipment and after removal when leaving the room.

## 2021-12-16 ENCOUNTER — OFFICE VISIT (OUTPATIENT)
Dept: FAMILY MEDICINE CLINIC | Facility: CLINIC | Age: 41
End: 2021-12-16

## 2021-12-16 VITALS
WEIGHT: 206.25 LBS | BODY MASS INDEX: 35.21 KG/M2 | DIASTOLIC BLOOD PRESSURE: 78 MMHG | HEART RATE: 77 BPM | HEIGHT: 64 IN | TEMPERATURE: 97.4 F | RESPIRATION RATE: 18 BRPM | SYSTOLIC BLOOD PRESSURE: 124 MMHG | OXYGEN SATURATION: 99 %

## 2021-12-16 DIAGNOSIS — J06.9 VIRAL UPPER RESPIRATORY TRACT INFECTION: ICD-10-CM

## 2021-12-16 DIAGNOSIS — E66.01 CLASS 2 SEVERE OBESITY DUE TO EXCESS CALORIES WITH SERIOUS COMORBIDITY AND BODY MASS INDEX (BMI) OF 35.0 TO 35.9 IN ADULT (HCC): ICD-10-CM

## 2021-12-16 DIAGNOSIS — T16.2XXD FOREIGN BODY OF LEFT EAR, SUBSEQUENT ENCOUNTER: ICD-10-CM

## 2021-12-16 DIAGNOSIS — J18.9 MULTIFOCAL PNEUMONIA: Primary | ICD-10-CM

## 2021-12-16 PROBLEM — R68.89 FLU-LIKE SYMPTOMS: Status: RESOLVED | Noted: 2020-07-30 | Resolved: 2021-12-16

## 2021-12-16 PROBLEM — R09.89 SYMPTOMS OF UPPER RESPIRATORY INFECTION (URI): Status: RESOLVED | Noted: 2021-03-18 | Resolved: 2021-12-16

## 2021-12-16 PROBLEM — H65.01 NON-RECURRENT ACUTE SEROUS OTITIS MEDIA OF RIGHT EAR: Status: RESOLVED | Noted: 2020-12-09 | Resolved: 2021-12-16

## 2021-12-16 PROBLEM — R50.9 FEVER: Status: RESOLVED | Noted: 2020-11-06 | Resolved: 2021-12-16

## 2021-12-16 PROCEDURE — 99213 OFFICE O/P EST LOW 20 MIN: CPT | Performed by: FAMILY MEDICINE

## 2021-12-16 RX ORDER — AZELASTINE HCL 205.5 UG/1
2 SPRAY NASAL 2 TIMES DAILY PRN
Qty: 30 ML | Refills: 0 | Status: SHIPPED | OUTPATIENT
Start: 2021-12-16

## 2021-12-16 NOTE — PROGRESS NOTES
Chief Complaint   Patient presents with   • Pneumonia       Subjective   Roseanna Charles is a 41 y.o. female.     Pneumonia  She complains of cough and shortness of breath. There is no wheezing. Primary symptoms comments: Sinus congestion . This is a recurrent problem. The current episode started 1 to 4 weeks ago. The problem occurs constantly. Associated symptoms include nasal congestion, postnasal drip and rhinorrhea. Pertinent negatives include no appetite change, chest pain, dyspnea on exertion, ear congestion, ear pain, fever, headaches, heartburn, malaise/fatigue, myalgias, orthopnea, PND, sneezing, sore throat, sweats, trouble swallowing or weight loss. Her symptoms are alleviated by steroid inhaler, rest and change in position (antibiotics ). She reports moderate improvement on treatment.      Patient is here for recheck from 12/10/21 and 12/6/21.  She is currently off work due to symptoms.  She does not feel significantly better.  She has not redeveloped nasal congestion with PND.  She continues to have fatigue and shortness of breath.  Symptoms developed while on doxycycline and oral steroids.      Past Medical History :  Active Ambulatory Problems     Diagnosis Date Noted   • Anxiety and depression 07/01/2019   • Migraine 08/09/2016   • Obstructive sleep apnea 08/09/2016   • Anemia 07/12/2019   • Diabetes (HCC) 07/12/2019   • Hypothyroid 07/12/2019   • Knee pain, right 07/12/2019   • Malaise and fatigue 07/12/2019   • Migraine without aura and without status migrainosus, not intractable 07/12/2019   • Annual physical exam 08/10/2019   • Essential hypertension 08/10/2019   • Right hip pain 08/10/2019   • Right upper quadrant pain 03/19/2020   • Bacterial respiratory infection 06/01/2020   • Bipolar disorder, current episode mixed, moderate (HCC) 07/02/2020     Resolved Ambulatory Problems     Diagnosis Date Noted   • Flu-like symptoms 07/30/2020   • Viral upper respiratory tract infection 07/31/2020   •  Fever 11/06/2020   • Non-recurrent acute serous otitis media of right ear 12/09/2020   • Symptoms of upper respiratory infection (URI) 03/18/2021     Past Medical History:   Diagnosis Date   • Abdominal pain    • Acute non-recurrent maxillary sinusitis    • Allergic    • Bacterial vaginosis    • Chicken pox    • History of ulcer disease    • Overweight (BMI 25.0-29.9)    • Pneumonia 12/1/21   • Upper respiratory infection, acute        Medication List:    Current Outpatient Medications:   •  albuterol sulfate  (90 Base) MCG/ACT inhaler, Inhale 2 puffs Every 4 (Four) Hours As Needed for Wheezing or Shortness of Air., Disp: 8 g, Rfl: 0  •  citalopram (CeleXA) 20 MG tablet, Take 1 tablet by mouth once daily, Disp: 90 tablet, Rfl: 0  •  diclofenac (VOLTAREN) 50 MG EC tablet, Take 1 tablet by mouth 2 (Two) Times a Day As Needed (pain)., Disp: 60 tablet, Rfl: 1  •  dicyclomine (Bentyl) 20 MG tablet, Take 1 tablet by mouth Every 6 (Six) Hours., Disp: 60 tablet, Rfl: 0  •  Euthyrox 125 MCG tablet, Take 1 tablet by mouth once daily, Disp: 30 tablet, Rfl: 0  •  fluticasone (Flonase) 50 MCG/ACT nasal spray, 2 sprays into the nostril(s) as directed by provider Daily. 2 puffs each nostril, Disp: 1 bottle, Rfl: 0  •  Loratadine (Claritin) 10 MG capsule, Take 10 mg by mouth Daily As Needed (congestion)., Disp: 30 each, Rfl: 0  •  metFORMIN (GLUCOPHAGE) 500 MG tablet, TAKE 1 TABLET BY MOUTH TWICE DAILY WITH MEALS, Disp: 180 tablet, Rfl: 1  •  ondansetron (ZOFRAN) 4 MG tablet, Take 1 tablet by mouth Every 8 (Eight) Hours As Needed for Nausea or Vomiting., Disp: 30 tablet, Rfl: 0  •  ondansetron ODT (ZOFRAN-ODT) 4 MG disintegrating tablet, , Disp: , Rfl:   •  QUEtiapine (SEROquel) 50 MG tablet, Take 0.5 tablets by mouth Every Night. Patient can take this medication Per MD as she has been on this combination, Disp: 30 tablet, Rfl: 0  •  SUMAtriptan (IMITREX) 50 MG tablet, Take 1 tablet by mouth Every 2 (Two) Hours As Needed for  "Migraine (Max is 200mg a day.). 1 tab at onset of headache. Repeat 1time in 2 hrs, Disp: 10 tablet, Rfl: 1  •  topiramate (TOPAMAX) 50 MG tablet, Take 1 tablet by mouth once daily, Disp: 30 tablet, Rfl: 0      No Known Allergies    Social History     Tobacco Use   • Smoking status: Never Smoker   • Smokeless tobacco: Never Used   Substance Use Topics   • Alcohol use: Yes     Alcohol/week: 0.0 standard drinks     Comment: A few times per year       Review of Systems   Constitutional: Negative for appetite change, fever, malaise/fatigue and unexpected weight loss.   HENT: Positive for congestion, postnasal drip and rhinorrhea. Negative for ear pain, sneezing, sore throat and trouble swallowing.    Respiratory: Positive for cough and shortness of breath. Negative for wheezing.    Cardiovascular: Negative for chest pain, dyspnea on exertion and PND.   Musculoskeletal: Negative for myalgias.   Allergic/Immunologic: Positive for environmental allergies.         Objective   Vitals:    12/16/21 0941   BP: 124/78   BP Location: Right arm   Patient Position: Sitting   Cuff Size: Adult   Pulse: 77   Resp: 18   Temp: 97.4 °F (36.3 °C)   TempSrc: Temporal   SpO2: 99%   Weight: 93.6 kg (206 lb 4 oz)   Height: 162.6 cm (64\")     Body mass index is 35.4 kg/m².    Physical Exam  Constitutional:       General: She is not in acute distress.     Appearance: Normal appearance. She is obese. She is not ill-appearing, toxic-appearing or diaphoretic.   HENT:      Head: Normocephalic and atraumatic.      Right Ear: Tympanic membrane, ear canal and external ear normal.      Left Ear: Tympanic membrane and external ear normal.      Ears:      Comments: Insect in left canal.  Removed successfully in office today with irrigation.     Nose: Congestion present.      Mouth/Throat:      Mouth: Mucous membranes are moist.      Pharynx: No oropharyngeal exudate.   Eyes:      General:         Right eye: No discharge.         Left eye: No discharge.    "   Conjunctiva/sclera: Conjunctivae normal.   Cardiovascular:      Rate and Rhythm: Normal rate and regular rhythm.      Heart sounds: Normal heart sounds.   Pulmonary:      Effort: Pulmonary effort is normal. No respiratory distress.      Breath sounds: No wheezing, rhonchi or rales.      Comments: Breath sounds improved, now clear; no longer wheezing; some difficulty with deep inhalation  Musculoskeletal:      Cervical back: Neck supple.      Right lower leg: No edema.      Left lower leg: No edema.   Neurological:      General: No focal deficit present.      Mental Status: She is alert and oriented to person, place, and time. Mental status is at baseline.       XR Chest 2 View     Result Date: 12/6/2021  Impression: No active cardiopulmonary disease  Electronically Signed By-Micah Hughes On:12/6/2021 4:20 PM This report was finalized on [id number] by  Micah Hughes, .      Assessment/Plan     Diagnoses and all orders for this visit:    1. Multifocal pneumonia (Primary)    2. Viral upper respiratory tract infection  -     azelastine (ASTEPRO) 0.15 % solution nasal spray; 2 sprays into the nostril(s) as directed by provider 2 (Two) Times a Day As Needed for Rhinitis.  Dispense: 30 mL; Refill: 0    3. Foreign body of left ear, subsequent encounter    4. Class 2 severe obesity due to excess calories with serious comorbidity and body mass index (BMI) of 35.0 to 35.9 in adult (HCC)    Resolving pneumonia.  Consider developing new viral URI vs environmental allergens.  Afebrile in office.  Add nasal spray for sinus congestion.    She may return to work on Monday.    Medication and medication adverse effects discussed.  Drug education given and explained to patient. Patient verbalized understanding.  All questions presented by patient addressed.    No follow-ups on file.       Patient was given instructions and counseling regarding his/her condition or for health maintenance advice. Please see specific information  pulled into the AVS if appropriate.     I wore protective equipment throughout this patient encounter to include mask. Hand hygiene was performed before donning protective equipment and after removal when leaving the room.

## 2021-12-22 DIAGNOSIS — J18.9 MULTIFOCAL PNEUMONIA: Primary | ICD-10-CM

## 2021-12-22 RX ORDER — AZITHROMYCIN 250 MG/1
TABLET, FILM COATED ORAL
Qty: 6 TABLET | Refills: 0 | Status: SHIPPED | OUTPATIENT
Start: 2021-12-22 | End: 2022-02-11

## 2021-12-24 ENCOUNTER — HOSPITAL ENCOUNTER (OUTPATIENT)
Dept: GENERAL RADIOLOGY | Facility: HOSPITAL | Age: 41
Discharge: HOME | End: 2021-12-24
Admitting: FAMILY MEDICINE

## 2021-12-24 PROCEDURE — 71046 X-RAY EXAM CHEST 2 VIEWS: CPT

## 2021-12-28 ENCOUNTER — TELEPHONE (OUTPATIENT)
Dept: FAMILY MEDICINE CLINIC | Facility: CLINIC | Age: 41
End: 2021-12-28

## 2021-12-28 NOTE — TELEPHONE ENCOUNTER
[facility] - [phone] REF#[id number] - [id number]    Calling about appts after 11/23/2021. Needs to verify information.

## 2021-12-30 NOTE — TELEPHONE ENCOUNTER
Spoke to [facility] 12/30/2021, 1:19pm answered questions needed for claim.  Prescription, return to work date, no restrictions

## 2022-01-05 DIAGNOSIS — E11.69 TYPE 2 DIABETES MELLITUS WITH OTHER SPECIFIED COMPLICATION, WITH LONG-TERM CURRENT USE OF INSULIN: Primary | ICD-10-CM

## 2022-01-05 DIAGNOSIS — Z79.4 TYPE 2 DIABETES MELLITUS WITH OTHER SPECIFIED COMPLICATION, WITH LONG-TERM CURRENT USE OF INSULIN: Primary | ICD-10-CM

## 2022-01-05 RX ORDER — FLASH GLUCOSE SENSOR
KIT MISCELLANEOUS
Qty: 1 EACH | Refills: 3 | Status: SHIPPED | OUTPATIENT
Start: 2022-01-05

## 2022-01-16 ENCOUNTER — E-VISIT (OUTPATIENT)
Dept: FAMILY MEDICINE CLINIC | Facility: TELEHEALTH | Age: 42
End: 2022-01-16

## 2022-01-16 NOTE — E-VISIT ESCALATED
Patient escalated   Provider Carmen Hsu chose to escalate patient to another level of care because: Needs followup for possible COVID-19   Patient was sent the following message:   Based on the information you've provided us, you need to take another step to get care.   What to do now:    Please set up a video visit  .   You won't be charged for your eVisit. If you paid with a credit card, the charge will be reversed.   Chief Complaint: Coronavirus (COVID-19), cold, sinus pain, allergy, or flu   Patient introduction   Patient is 41-year-old female who reports congestion, headache, myalgia, fatigue, and nausea or vomiting that started < 48 hours ago.   Patient has not requested COVID testing.   Coronavirus Disease 2019 (COVID-19) exposure, testing history, vaccination status, and vaccine injection site symptoms:    No known exposure to a confirmed or suspected case of COVID-19.    No recent travel outside of their local community.    Patient had a viral lab test 1-3 months ago. Test result was negative.    Reports receiving 2 doses of the COVID-19 vaccine.    Received the Pfizer vaccine for the first dose.    Received the Pfizer vaccine for the second dose.    Received their most recent dose of the vaccine > 14 days ago.   Warning. The following may warrant further investigation:    Diabetes    BMI of 30 to 39   When asked why they're seeking care online today, patient reports they want to know if they have a cold or something more serious, need a doctor's note, and just want to feel better.   Patient requests a 2-day excuse note.   General presentation   Symptoms came on suddenly.   Fever:    Denies fever.   Sinus and nasal symptoms:    Reports postnasal drip.    Reports congestion without associated pain or pressure.    Denies rhinitis.    Denies itchy nose or sneezing.    Denies nasal drainage.   Sore throat:    Denies sore throat.   Head and body aches:    Reports headache, described as moderate (4-6 on a  scale of 1-10).    Reports myalgia.    Reports fatigue.    Denies sweats.    Denies chills.   Dizziness:    Denies dizziness.   Cough:    Denies cough.   Wheezing and SOB:    Reports previous albuterol inhaler use during URIs, bronchitis, or pneumonia.    Not using an albuterol inhaler for current symptoms because they don't feel they need it.    Denies COPD diagnosis.    Denies asthma diagnosis.    Denies wheezing.    Denies shortness of breath.    Denies previous steroid inhaler use during URIs, bronchitis, or pneumonia.   Chest pain:    Reports chest pain.    Describes chest pain as cardiogenic.    Chest pain is exacerbated by emotional stress.    Pain is not reproducible with palpation.    Denies history of angina, coronary artery disease, occlusive vascular disease/peripheral artery disease, myocardial infarction, cerebrovascular disease, or transient ischemic attack.    Reports that emotional stress makes their chest pain worse. However, chest pain is not the patient's chief complaint.    [place] Heart Score (MHS): 2, moderate risk of CAD. Assigning 1 point to each of 5 criteria (female >= 65 years old or male >= 55 years, known CAD, pain worse with exercise, pain not reproducible with palpation, and patient assumes pain is cardiac), the MHS is a validated clinical decision rule used to rule out coronary artery disease in primary care patients with chest pain.   Allergies:    Reports history of allergies.    Patient has known seasonal allergies and known dust allergies.   Flu exposure:    Denies recent exposure to confirmed flu diagnosis.    Denies receiving a flu vaccine this season.   Patient denies the following red flags:    Changes in alertness or awareness    Symptoms suggesting intracranial hemorrhage    Decreased urination   Risk factors for antibiotic resistance:    Diabetes   Pregnancy/menstrual status/breastfeeding:    Denies being pregnant    Denies breastfeeding    Regarding last menstrual period,  patient writes: December 2021   Self-exam:    No difficulty moving their chin toward their chest    Neck lymph nodes feel normal    Denies antibiotic treatment for similar symptoms within the past month   Current medications   Denies taking over-the-counter medication for current symptoms.   Denies taking other medications or supplements.   Medication allergies   None.   Medication contraindication review   Reports history positive for depression. Therefore, the following medication(s) will not be prescribed:    Metoclopramide   Denies history of metoclopramide-associated dystonic reaction and tardive dyskinesia.   No known history of amoxicillin-clavulanate-associated cholestatic jaundice or hepatic impairment.   No known history of azithromycin-associated cholestatic jaundice or hepatic impairment.   Past medical history   Immune conditions: Denies immunocompromising conditions. Denies history of cancer.   Social history   Non-smoker.   Assessment:   Patient determined to need a level of care not appropriate to be delivered through eVisit.   Plan:   Patient informed of need to seek in-person care      ----------   Electronically signed by MAYURI Rascon on 2022-01-16 at 12:06PM   ----------   Patient Interview Transcript:   Why are you getting care through eVisit today? We can't guarantee a specific treatment or test. Your provider will decide what's best for you. Select all that apply.    I want to know if I have a cold or something more serious    I need a doctor's note    I just want to feel better!   Not selected:    I want a specific treatment or medication    I want to know if I need to be seen by a provider    I want to be tested for COVID-19    I want to get the COVID-19 vaccine    I think I'm having side effects from the COVID-19 vaccine    None of the above   Which of these symptoms are bothering you? Select all that apply.    Stuffed-up nose or sinuses    Headache    Muscle or body aches    Fatigue  or tiredness    Nausea or vomiting   Not selected:    Cough    Shortness of breath    Fever    Runny nose    Itchy or watery eyes    Itchy nose or sneezing    Loss of smell or taste    Sore throat    Hoarse voice or loss of voice    Sweats    Chills    Diarrhea    I don't have any of these symptoms   Before we learn more about why you're here, we'll get some information related to COVID-19. We'll ask about risk factors, testing, vaccination status, vaccine injection site symptoms, and exposure. Do you have any of these conditions? If so, you may be at increased risk for complications from COVID-19. Select all that apply.    None of the above   Not selected:    Chronic lung disease, such as cystic fibrosis or interstitial fibrosis    Heart disease, such as congenital heart disease, congestive heart failure, or coronary artery disease    Disorder of the brain, spinal cord, or nerves and muscles, such as dementia, cerebral palsy, epilepsy, muscular dystrophy, or developmental delay    Metabolic disorder or mitochondrial disease    Cerebrovascular disease, such as stroke or another condition affecting the blood vessels or blood supply to the brain   Do you live in a group care setting? Examples include: - Nursing home - Residential care - Psychiatric treatment facility - Group home - [facility] - [facility] and care home - Homeless shelter - Foster care setting Select one.    No   Not selected:    Yes   Have you ever been tested for COVID-19? Select one.    Yes   Not selected:    No   When was your most recent COVID-19 test? Select one.    1 to 3 months ago   Not selected:    Today    Yesterday    2 to 4 days ago    5 to 7 days ago    7 to 14 days ago    15 to 30 days ago    More than 3 months ago   What type of COVID-19 test did you most recently have? There are two types of COVID-19 tests: - Viral tests check if you're currently infected with COVID-19. For these tests, a nose swab or saliva sample is taken. Viral tests  "include self-tests and tests done at a doctor's office, lab, or testing site. - Antibody tests check if you've been infected in the past. For these tests, your blood is drawn. Antibody tests can only be done at a doctor's office, lab, or testing site. Select one.    Viral test at a doctor's office, lab, or testing site   Not selected:    Viral self-test    Antibody test   What was the result of your most recent COVID-19 test? Select one.    Negative   Not selected:    Positive    I'm not sure   Have you gotten the COVID-19 vaccine? Select one.    Yes   Not selected:    No   How many doses of the COVID-19 vaccine have you gotten? This includes boosters. Select one.    2 doses   Not selected:    1 dose    3 doses   Which COVID-19 vaccine did you get for your first dose? Check your Vaccination Record Card under Product Name/. Select one.    Pfizer-BioNTech (Pfizer)   Not selected:    Max & Max's Julianna Vaccine (J&J/Julianna)    Moderna   Which COVID-19 vaccine did you get for your second dose? Check your Vaccination Record Card under Product Name/. Select one.    Pfizer-BioNTech (Pfizer)   Not selected:    Max & Max's Julianna Vaccine (J&J/Julianna)    Moderna   When did you get your most recent dose of the COVID-19 vaccine?    More than 14 days ago   Not selected:    Less than 48 hours (2 days) ago    48 to 72 hours (3 days) ago    3 to 5 days ago    5 to 7 days ago    7 to 14 days ago   In the last 14 days, have you traveled outside of your local community? This includes travel by car, RV, bus, train, or plane. Travel increases your chances of getting and spreading COVID-19. Select one.    No   Not selected:    Yes   In the last 14 days, have you had close contact with someone who has coronavirus (COVID-19)? \"Close contact\" means any of these: - Living in the same household as someone with COVID-19. - Caring for someone with COVID-19. - Being within 6 feet of someone with " "COVID-19 for a total of at least 15 minutes over a 24-hour period. For example, three 5-minute exposures for a total of 15 minutes. - Being in direct contact with respiratory droplets from someone with COVID-19 (being coughed on, kissing, sharing utensils). Select one.    No, not that I know of   Not selected:    Yes, a confirmed case    Yes, a suspected case   Thanks for completing our COVID-19 questions. Now we'll return to your symptoms. When did your symptoms start? If you know the exact date your symptoms started, choose Other and enter the month and day. Select one.    Less than 48 hours ago   Not selected:    3 to 5 days ago    6 to 9 days ago    10 to 14 days ago    2 to 4 weeks ago    More than a month ago    Other (specify)   Did your symptoms come on suddenly or gradually? Select one.    Suddenly   Not selected:    Gradually    I'm not sure   You mentioned having a headache. On a scale of 1 to 10, how severe is your headache pain? Select one.    Moderate (4 to 6)   Not selected:    Mild (1 to 3)    Severe (7 to 9)    Unbearable (10)    The worst headache of my life (10+)   You mentioned having a stuffy nose or sinus congestion. Do you feel pain or pressure in your sinuses?    No   Not selected:    Yes    I'm not sure   What color is your nasal drainage? Select one.    My nose is stuffed but not draining or running   Not selected:    Clear    White    Yellow    Green    I'm not sure   Is there any drainage (mucus) going down the back of your throat? This kind of drainage is also called \"postnasal drip.\" Select one.    Yes   Not selected:    No    I'm not sure   Since your symptoms started, have you felt dizzy? Select one.    No   Not selected:    Yes, but I can continue with my regular daily activities    Yes, and it makes it hard to stand, walk, or do daily activities   Do you have chest pain? You might also feel it as discomfort, aching, tightness, or squeezing in the chest. Select one.    Yes   Not " "selected:    No   Which of these are true of your chest pain?    It feels like it's coming from my heart   Not selected:    It feels like it's spreading into my shoulders, neck, arms, or jaw    It feels like crushing pressure on my chest    It feels like a sharp, stabbing pain    It makes me sweat a lot more than usual    None of the above   Do any of these make your chest pain worse? Select all that apply.    Emotional stress   Not selected:    Exertion or exercise    Taking a deep breath    None of the above   Gently press on your chest with the palm of your hand. Does your chest pain feel worse? Select one.    No   Not selected:    Yes   Have you ever had any of these conditions? Select all that apply.    None of the above   Not selected:    Angina    Blocked arteries in the heart    Blocked arteries in the legs    Heart attack    Stroke (or \"mini-stroke\")   Have you urinated at least 3 times in the last 24 hours? Select one.    Yes   Not selected:    No    I'm not sure   Changes in alertness or awareness may mean you need emergency care. Since your symptoms started, have you had any of these? Select all that apply.    None of the above   Not selected:    Confusion    Slurred speech    Not knowing where you are or what day it is    Difficulty staying conscious    Fainting or passing out   Do your symptoms include a whistling sound, or wheezing, when you breathe? Select one.    No   Not selected:    Yes    I'm not sure   Do you have any of these symptoms in your ear(s)? Select all that apply.    None of the above   Not selected:    Pain    Pressure    Fullness    Crackling or popping    Plugged or blocked sensation   Can you move your chin toward your chest?    Yes   Not selected:    No, my neck is too stiff   Are your glands/lymph nodes swollen, or does it hurt when you touch them?    No   Not selected:    Yes    I'm not sure   People with a very high body mass index (BMI) are at higher risk for developing " complications from the flu and severe illness from COVID-19. To determine your BMI, we need to know your weight and height. Please enter your weight (in pounds).    Weight   Please enter your height.    Height   In the past week, has anyone around you (such as at school, work, or home) had a confirmed diagnosis of the flu? A confirmed diagnosis means that a nose swab was done to verify a flu infection. Select all that apply.    No   Not selected:    I live with someone who has the flu    I've been within touching distance of someone who has the flu    I've walked by, or sat about 3 feet away from, someone who has the flu    I've been in the same building as someone who has the flu    I'm not sure   Have you ever been diagnosed with asthma? Select one.    No   Not selected:    Yes   Have you ever been prescribed albuterol to use for wheezing, cough, or shortness of breath caused by a cold, bronchitis, or pneumonia? Albuterol (ProAir, Proventil, Ventolin) is prescribed as an inhaler or a solution to be used with a nebulizer machine. Select one.    Yes   Not selected:    No    I'm not sure   Have you ever been prescribed a steroid inhaler to use for wheezing, cough, or shortness of breath caused by a cold, bronchitis, or pneumonia? Some examples of steroid inhalers include Pulmicort, Flovent, Qvar, and Alvesco. Select one.    No   Not selected:    Yes    I'm not sure   Have you used albuterol for your current symptoms? This includes both albuterol inhalers and albuterol solution in a nebulizer machine. Select one.    No, I don't feel like I need it   Not selected:    Yes    No, I don't have any, or it's    Would you like a prescription for albuterol? Select one.    No   Not selected:    Yes   Have you ever been diagnosed with chronic obstructive pulmonary disease (COPD)? Select one.    No   Not selected:    Yes    I'm not sure   Do you have allergies (pollen, dust mites, mold, animal dander)? Select one.     Yes   Not selected:    No    I'm not sure   What kind of allergies do you have? Select all that apply.    Seasonal allergies (hay fever)    Dust allergies   Not selected:    Pet allergies    None of the above    I'm not sure   Do you think your symptoms could be allergy-related? Select one.    I'm not sure   Not selected:    Yes    No   Have you had a flu shot this season? Select one.    No   Not selected:    Yes, less than 2 weeks ago    Yes, 2 to 4 weeks ago    Yes, 1 to 3 months ago    Yes, 3 to 6 months ago    Yes, more than 6 months ago    I'm not sure   The flu and COVID-19 can be more serious for people with certain conditions or characteristics. These questions help us figure out if you or anyone you live with is at higher risk for complications from these infections. Do either of these statements apply to you? Select all that apply.    None of the above   Not selected:    I'm  or Native [place]    I'm a healthcare worker   Do you smoke tobacco? Select one.    No, never   Not selected:    Yes, every day    Yes, some days    No, I quit   Some conditions can put you at risk for more serious infections. Do any of these apply to you? Select all that apply.    I have diabetes   Not selected:    I've been hospitalized within the last 5 days    I'm in close contact with a child in     None of the above   Are you currently being treated for any of these conditions? Scroll to see all options. Select all that apply.    Depression   Not selected:    Aspirin triad (also known as Samter's triad or ASA triad)    Asthma or hives from taking aspirin or other NSAIDs, such as ibuprofen or naproxen    Blockage or narrowing of the blood vessels of the heart    Blood dyscrasia, such anemia, leukemia, lymphoma, or myeloma    Bone marrow depression    Catecholamine-releasing paraganglioma    Blood clotting disorder    Congenital long QT syndrome    Difficulty urinating or completely emptying your bladder     Uncorrected electrolyte abnormalities    Fungal infection    Gastrointestinal (GI) bleeding    Gastrointestinal (GI) obstruction    G6PD deficiency    Recent heart attack    High blood pressure    Irregular heartbeat or heart rhythm    Kidney disease or hemodialysis    Mononucleosis (mono)    Myasthenia gravis    Parkinson's disease    Pheochromocytoma    Reye syndrome    Seizure disorder    Ulcerative colitis    None of the above   Do you have any of these conditions that can affect the immune system? Scroll to see all options. Select all that apply.    None of these   Not selected:    History of bone marrow transplant    Chronic kidney disease    Chronic liver disease (including cirrhosis)    HIV/AIDS    Inflammatory bowel disease (Crohn's disease or ulcerative colitis)    Lupus    Moderate to severe plaque psoriasis    Multiple sclerosis    Rheumatoid arthritis    Sickle cell anemia    Alpha or beta thalassemia    History of solid organ transplant (kidney, liver, or heart)    History of spleen removal    An autoimmune disorder not listed here    A condition requiring treatment with long-term use of oral steroids (such as prednisone, prednisolone, or dexamethasone)   Have you ever been diagnosed with cancer? Select one.    No   Not selected:    Yes, I have cancer now    Yes, but I'm in remission   Have you ever had either of these conditions? Select all that apply.    No   Not selected:    Metoclopramide-associated dystonic reaction    Tardive dyskinesia   Do any of these apply to the people who live with you? Select all that apply.    None of the above   Not selected:    A child under the age of 5    An adult 65 or older    A person who is pregnant    A person who has given birth, had a miscarriage, had a pregnancy loss, or had an  in the last 2 weeks    An  or Native [place]   Does any member of your household have any of these medical conditions? Select all that apply.    None of the  above   Not selected:    Asthma    Disorders of the brain, spinal cord, or nerves and muscles, such as dementia, cerebral palsy, epilepsy, muscular dystrophy, or developmental delay    Chronic lung disease, such as COPD or cystic fibrosis    Heart disease, such as congenital heart disease, congestive heart failure, or coronary artery disease    Cerebrovascular disease, such as stroke or another condition affecting the blood vessels or blood supply to the brain    Blood disorders, such as sickle cell disease    Diabetes    Metabolic disorders such as inherited metabolic disorders or mitochondrial disease    Kidney disorders    Liver disorders    Weakened immune system due to illness or medications such as chemotherapy or steroids    Children under the age of 19 who are on long-term aspirin therapy    Extreme obesity (BMI > 40)   Are you pregnant? Select one.    No   Not selected:    Yes   When was your last menstrual period? If you don't currently have periods or no longer have periods, please briefly explain.    2021   Within the last 2 weeks, have you: - Given birth - Had a miscarriage - Had a pregnancy loss - Had an  Being postpartum (live birth or loss) within the last 2 weeks increases your risk of flu complications. Select one.    No   Not selected:    Yes   Are you breastfeeding? Select one.    No   Not selected:    Yes   Just a few more questions about medications, and then you're finished. Have you used any non-prescription medications or nasal sprays for your current symptoms? Examples include saline sprays, decongestants, NyQuil, and Tylenol. Select one.    No   Not selected:    Yes   In the past month, have you taken antibiotics for similar symptoms? Examples of antibiotics include amoxicillin, amoxicillin-clavulanate (Augmentin), penicillin, cefdinir (Omnicef), doxycycline, and clindamycin (Cleocin). Select one.    No   Not selected:    Yes    I'm not sure   Have you taken any monoamine  oxidase inhibitor (MAOI) medications in the last 14 days? Examples include rasagiline (Azilect), selegiline (Eldepryl, Zelapar), isocarboxazid (Marplan), phenelzine (Nardil), and tranylcypromine (Parnate). Select one.    No   Not selected:    Yes    I'm not sure   Do you take Kynmobi or Apokyn (apomorphine)? Select one.    No   Not selected:    Yes    I'm not sure   Are you taking any other medications or supplements? On the next screen, you need to list all vitamins, supplements, non-prescription medications (such as aspirin or Aleve), and prescription medications that you're taking. Select one.    No   Not selected:    Yes    Yes, but I'm not sure what they are   Have you ever had an allergic or bad reaction to any medication? Select one.    No   Not selected:    Yes   Are you allergic to milk or to the proteins found in milk (for example, whey or casein)? A milk allergy is different from lactose intolerance. Select one.    No   Not selected:    Yes    I'm not sure   Have you ever had jaundice or liver problems as a result of taking amoxicillin-clavulanate (Augmentin)? Jaundice is a condition in which the skin and the whites of the eyes turn yellow. Select all that apply.    No, not that I know of   Not selected:    Yes, jaundice    Yes, liver problems   Have you ever had jaundice or liver problems as a result of taking azithromycin (Zithromax, Zmax)? Jaundice is a condition in which the skin and the whites of the eyes turn yellow. Select all that apply.    No, not that I know of   Not selected:    Yes, jaundice    Yes, liver problems   Do you need a doctor's note? A doctor's note confirms that you received care today and states when you can return to school or work. It does not contain information about your diagnosis or treatment plan. Your provider will make the final decision on whether to give you a doctor's note and for how long. Doctor's notes CANNOT be backdated. We can't provide medical leave paperwork  through this type of visit. If more paperwork is needed to request time off, contact your primary care provider. Select one.    Today and tomorrow (2 days)   Not selected:    Today only (1 day)    3 days    7 days    10 days    14 days    No   Is there anything else you'd like to tell us about your symptoms?   The patient did not enter any additional information.   ----------   Medical history   Medical history data does not currently exist for this patient.

## 2022-01-29 ENCOUNTER — E-VISIT (OUTPATIENT)
Dept: FAMILY MEDICINE CLINIC | Facility: TELEHEALTH | Age: 42
End: 2022-01-29

## 2022-01-29 DIAGNOSIS — J06.9 ACUTE URI: Primary | ICD-10-CM

## 2022-01-29 PROCEDURE — BRIGHTMDVISIT: Performed by: NURSE PRACTITIONER

## 2022-01-29 RX ORDER — BROMPHENIRAMINE MALEATE, PSEUDOEPHEDRINE HYDROCHLORIDE, AND DEXTROMETHORPHAN HYDROBROMIDE 2; 30; 10 MG/5ML; MG/5ML; MG/5ML
5 SYRUP ORAL 4 TIMES DAILY PRN
Qty: 118 ML | Refills: 0 | Status: SHIPPED | OUTPATIENT
Start: 2022-01-29 | End: 2022-02-08

## 2022-01-29 RX ORDER — ONDANSETRON 4 MG/1
4 TABLET, FILM COATED ORAL EVERY 8 HOURS PRN
Qty: 20 TABLET | Refills: 0 | Status: SHIPPED | OUTPATIENT
Start: 2022-01-29 | End: 2022-02-08

## 2022-01-29 NOTE — E-VISIT TREATED
Chief Complaint: Coronavirus (COVID-19), cold, sinus pain, allergy, or flu   Patient introduction   Patient is 41-year-old female who reports fever (which may have resolved; see below), congestion, headache, and nausea or vomiting that started < 48 hours ago.   Patient has not requested COVID testing.   Coronavirus Disease 2019 (COVID-19) exposure, testing history, vaccination status, and vaccine injection site symptoms:    No known exposure to a confirmed or suspected case of COVID-19.    No recent travel outside of their local community.    Patient had a viral lab test 1-3 months ago. Test result was negative.    Reports receiving 2 doses of the COVID-19 vaccine.    Received the Pfizer vaccine for the first dose.    Received the Pfizer vaccine for the second dose.    Received their most recent dose of the vaccine > 14 days ago.   Warning. The following may warrant further investigation:    Diabetes    BMI of 30 to 39   When asked why they're seeking care online today, patient reports they want to know if they have a cold or something more serious, need a doctor's note, and just want to feel better.   Patient-submitted comments:   Patient writes: I feel like it is maybe a sinus infection but I am not sure..   Patient requests a 1-day excuse note.   General presentation   Symptoms came on suddenly.   Fever:    Reports having had unmeasured, mild fever, which has since resolved. Patient's current temperature is normal.   Sinus and nasal symptoms:    Reports postnasal drip.    Reports congestion without associated pain or pressure.    Denies rhinitis.    Denies itchy nose or sneezing.    Denies nasal drainage.   Sore throat:    Denies sore throat.   Head and body aches:    Reports headache, described as moderate (4-6 on a scale of 1-10).    Denies sweats.    Denies chills.    Denies myalgia.    Denies fatigue.   Dizziness:    Denies dizziness.   Cough:    Denies cough.   Wheezing and SOB:    Reports previous  albuterol inhaler use during URIs, bronchitis, or pneumonia.    Not using an albuterol inhaler for current symptoms because they don't feel they need it.    Denies COPD diagnosis.    Denies asthma diagnosis.    Denies wheezing.    Denies shortness of breath.    Denies previous steroid inhaler use during URIs, bronchitis, or pneumonia.   Chest pain:    Denies chest pain.   Allergies:    Reports history of allergies.    Patient thinks symptoms are allergy-related.    Patient has known seasonal allergies and known dust allergies.   Flu exposure:    Denies recent exposure to confirmed flu diagnosis.    Denies receiving a flu vaccine this season.   Patient denies the following red flags:    Changes in alertness or awareness    Symptoms suggesting intracranial hemorrhage    Decreased urination   Risk factors for antibiotic resistance:    Diabetes   Pregnancy/menstrual status/breastfeeding:    Denies being pregnant    Denies breastfeeding    Regarding last menstrual period, patient writes: Last month   Self-exam:    No difficulty moving their chin toward their chest    Neck lymph nodes feel normal    Denies antibiotic treatment for similar symptoms within the past month   Current medications   Denies taking over-the-counter medication for current symptoms.   Reports taking citalopram 10 MG, metformin hydrochloride 500 MG [Orabet Metformin], quetiapine (as fumarate), phentermine / topiramate, and naproxen / sumatriptan Oral Product.   Medication allergies   None.   Medication contraindication review   Reports history positive for depression. Therefore, the following medication(s) will not be prescribed:    Metoclopramide   Denies history of metoclopramide-associated dystonic reaction and tardive dyskinesia.   No known history of amoxicillin-clavulanate-associated cholestatic jaundice or hepatic impairment.   No known history of azithromycin-associated cholestatic jaundice or hepatic impairment.   Past medical history    Immune conditions: Denies immunocompromising conditions. Denies history of cancer.   Social history   Non-smoker.   Assessment   Viral URI, cannot rule out influenza or COVID-19.   [facility] required information for COVID-19 lab data reporting (if test is ordered):   Symptoms:    Fever    Headache    Nasal congestion    Nausea or vomiting   Symptom onset: < 48 hours ago ago  Pregnancy: No or N/A  Healthcare worker? No  Resident in a congregate care setting? No  Previous history of COVID-19 testing: Patient had a viral lab test 1-3 months ago. Test result was negative.     Plan   Medications:   No medications prescribed.   Other:   Patient was given an excuse note for 2 days.   Education:    Condition and causes    Prevention    Treatment and self-care    When to call provider      ----------   Electronically signed by MAYURI Mejia on 2022-01-29 at 11:51AM   ----------   Patient Interview Transcript:   Why are you getting care through eVisit today? We can't guarantee a specific treatment or test. Your provider will decide what's best for you. Select all that apply.    I want to know if I have a cold or something more serious    I need a doctor's note    I just want to feel better!   Not selected:    I want to know if I need to be seen by a provider    I want to be tested for COVID-19    I want to get the COVID-19 vaccine    I think I'm having side effects from the COVID-19 vaccine    Other (specify on next screen)   COVID-19 symptoms are similar to symptoms of other illnesses. This makes it difficult to diagnose. Please carefully consider each question and answer as best you can. This will help your provider make the right diagnosis. Which of these symptoms are bothering you? Select all that apply.    Fever    Stuffed-up nose or sinuses    Headache    Nausea or vomiting   Not selected:    Cough    Shortness of breath    Runny nose    Itchy or watery eyes    Itchy nose or sneezing    Loss of smell or taste    Sore  throat    Hoarse voice or loss of voice    Sweats    Chills    Muscle or body aches    Fatigue or tiredness    Diarrhea    I don't have any of these symptoms   Do you have any of these conditions? These conditions can put you at increased risk for severe disease if you're infected with COVID-19. Select all that apply.    None of the above   Not selected:    Chronic lung disease, such as cystic fibrosis or interstitial fibrosis    Heart disease, such as congenital heart disease, congestive heart failure, or coronary artery disease    Disorder of the brain, spinal cord, or nerves and muscles, such as dementia, cerebral palsy, epilepsy, muscular dystrophy, or developmental delay    Metabolic disorder or mitochondrial disease    Cerebrovascular disease, such as stroke or another condition affecting the blood vessels or blood supply to the brain   Do you live in a group care setting? Examples include: - Nursing home - Residential care - Psychiatric treatment facility - [facility] - [facility] and [facility] - Homeless shelter - Foster care setting Select one.    No   Not selected:    Yes   Have you ever been tested for COVID-19? Select one.    Yes   Not selected:    No   When was your most recent COVID-19 test? Select one.    1 to 3 months ago   Not selected:    Today    Yesterday    2 to 4 days ago    5 to 7 days ago    7 to 14 days ago    15 to 30 days ago    More than 3 months ago   What type of COVID-19 test did you most recently have? There are two types of COVID-19 tests: - Viral tests check if you're currently infected with COVID-19. For these tests, a nose swab or saliva sample is taken. Viral tests include self-tests and tests done at a doctor's office, lab, or testing site. - Antibody tests check if you've been infected in the past. For these tests, your blood is drawn. Antibody tests can only be done at a doctor's office, lab, or testing site. Select one.    Viral test at a doctor's office, lab, or  "testing site   Not selected:    Viral self-test    Antibody test   What was the result of your most recent COVID-19 test? Select one.    Negative   Not selected:    Positive    I'm not sure   Have you gotten the COVID-19 vaccine? Select one.    Yes   Not selected:    No   How many doses of the COVID-19 vaccine have you gotten? This includes boosters. Select one.    2 doses   Not selected:    1 dose    3 doses   Which COVID-19 vaccine did you get for your first dose? Check your Vaccination Record Card under Product Name/. Select one.    Pfizer-BioNTech (Pfizer)   Not selected:    Max & Max's Julianna Vaccine (J&J/Julianna)    Moderna   Which COVID-19 vaccine did you get for your second dose? Check your Vaccination Record Card under Product Name/. Select one.    Pfizer-BioNTech (Pfizer)   Not selected:    Max & Max's Julianna Vaccine (J&J/Julianna)    Moderna   When did you get your most recent dose of the COVID-19 vaccine?    More than 14 days ago   Not selected:    Less than 48 hours (2 days) ago    48 to 72 hours (3 days) ago    3 to 5 days ago    5 to 7 days ago    7 to 14 days ago   In the last 14 days, have you traveled outside of your local community? This includes travel by car, RV, bus, train, or plane. Travel increases your chances of getting and spreading COVID-19. Select one.    No   Not selected:    Yes   In the last 14 days, have you had close contact with someone who has coronavirus (COVID-19)? \"Close contact\" means any of these: - Living in the same household as someone with COVID-19. - Caring for someone with COVID-19. - Being within 6 feet of someone with COVID-19 for a total of at least 15 minutes over a 24-hour period. For example, three 5-minute exposures for a total of 15 minutes. - Being in direct contact with respiratory droplets from someone with COVID-19 (being coughed on, kissing, sharing utensils). Select one.    No, not that I know of   Not selected:   " " Yes, a confirmed case    Yes, a suspected case   When did your current symptoms start? If you know the exact date your symptoms started, choose Other and enter the month and day. Select one.    Less than 48 hours ago   Not selected:    3 to 5 days ago    6 to 9 days ago    10 to 14 days ago    2 to 4 weeks ago    More than a month ago    Other (specify)   Did your symptoms come on suddenly or gradually? Select one.    Suddenly   Not selected:    Gradually    I'm not sure   You mentioned having a fever. Do you have a fever now? Select one.    No, it's gone now   Not selected:    Yes, and I've had one since my symptoms started    Yes, but I didn't have one when my symptoms started    I'm not sure   Did you take your temperature with a thermometer? Select one.    No, but it felt mild   Not selected:    No, but it felt high    Yes   You mentioned having a headache. On a scale of 1 to 10, how severe is your headache pain? Select one.    Moderate (4 to 6)   Not selected:    Mild (1 to 3)    Severe (7 to 9)    Unbearable (10)    The worst headache of my life (10+)   You mentioned having a stuffy nose or sinus congestion. Do you feel pain or pressure in your sinuses?    No   Not selected:    Yes    I'm not sure   What color is your nasal drainage? Select one.    My nose is stuffed but not draining or running   Not selected:    Clear    White    Yellow    Green    I'm not sure   Is there any drainage (mucus) going down the back of your throat? This kind of drainage is also called \"postnasal drip.\" Select one.    Yes   Not selected:    No    I'm not sure   Since your symptoms started, have you felt dizzy? Select one.    No   Not selected:    Yes, but I can continue with my regular daily activities    Yes, and it makes it hard to stand, walk, or do daily activities   Do you have chest pain? You might also feel it as discomfort, aching, tightness, or squeezing in the chest. Select one.    No   Not selected:    Yes   Have you " urinated at least 3 times in the last 24 hours? Select one.    Yes   Not selected:    No    I'm not sure   Changes in alertness or awareness may mean you need emergency care. Since your symptoms started, have you had any of these? Select all that apply.    None of the above   Not selected:    Confusion    Slurred speech    Not knowing where you are or what day it is    Difficulty staying conscious    Fainting or passing out   Do your symptoms include a whistling sound, or wheezing, when you breathe? Select one.    No   Not selected:    Yes    I'm not sure   Do you have any of these symptoms in your ear(s)? Select all that apply.    None of the above   Not selected:    Pain    Pressure    Fullness    Crackling or popping    Plugged or blocked sensation   Can you move your chin toward your chest?    Yes   Not selected:    No, my neck is too stiff   Are your glands/lymph nodes swollen, or does it hurt when you touch them?    No   Not selected:    Yes    I'm not sure   People with a very high body mass index (BMI) are at higher risk for developing complications from the flu and severe illness from COVID-19. To determine your BMI, we need to know your weight and height. Please enter your weight (in pounds).    Weight   Please enter your height.    Height   In the past week, has anyone around you (such as at school, work, or home) had a confirmed diagnosis of the flu? A confirmed diagnosis means that a nose swab was done to verify a flu infection. Select all that apply.    No   Not selected:    I live with someone who has the flu    I've been within touching distance of someone who has the flu    I've walked by, or sat about 3 feet away from, someone who has the flu    I've been in the same building as someone who has the flu    I'm not sure   Have you ever been diagnosed with asthma? Select one.    No   Not selected:    Yes   Have you ever been prescribed albuterol to use for wheezing, cough, or shortness of breath  caused by a cold, bronchitis, or pneumonia? Albuterol (ProAir, Proventil, Ventolin) is prescribed as an inhaler or a solution to be used with a nebulizer machine. Select one.    Yes   Not selected:    No    I'm not sure   Have you ever been prescribed a steroid inhaler to use for wheezing, cough, or shortness of breath caused by a cold, bronchitis, or pneumonia? Some examples of steroid inhalers include Pulmicort, Flovent, Qvar, and Alvesco. Select one.    No   Not selected:    Yes    I'm not sure   Have you used albuterol for your current symptoms? This includes both albuterol inhalers and albuterol solution in a nebulizer machine. Select one.    No, I don't feel like I need it   Not selected:    Yes    No, I don't have any, or it's    Would you like a prescription for albuterol? Select one.    No   Not selected:    Yes   Have you ever been diagnosed with chronic obstructive pulmonary disease (COPD)? Select one.    No   Not selected:    Yes    I'm not sure   Do you have allergies (pollen, dust mites, mold, animal dander)? Select one.    Yes   Not selected:    No    I'm not sure   What kind of allergies do you have? Select all that apply.    Seasonal allergies (hay fever)    Dust allergies   Not selected:    Pet allergies    None of the above    I'm not sure   Do you think your symptoms could be allergy-related? Select one.    Yes   Not selected:    No    I'm not sure   Have you had a flu shot this season? Select one.    No   Not selected:    Yes, less than 2 weeks ago    Yes, 2 to 4 weeks ago    Yes, 1 to 3 months ago    Yes, 3 to 6 months ago    Yes, more than 6 months ago    I'm not sure   The flu and COVID-19 can be more serious for people with certain conditions or characteristics. These questions help us figure out if you or anyone you live with is at higher risk for complications from these infections. Do either of these statements apply to you? Select all that apply.    None of the above   Not  selected:    I'm  or Native [place]    I'm a healthcare worker   Do you smoke tobacco? Select one.    No, never   Not selected:    Yes, every day    Yes, some days    No, I quit   Some conditions can put you at risk for more serious infections. Do any of these apply to you? Select all that apply.    I have diabetes   Not selected:    I've been hospitalized within the last 5 days    I'm in close contact with a child in     None of the above   Are you currently being treated for any of these conditions? Scroll to see all options. Select all that apply.    Depression   Not selected:    Aspirin triad (also known as Samter's triad or ASA triad)    Asthma or hives from taking aspirin or other NSAIDs, such as ibuprofen or naproxen    Blockage or narrowing of the blood vessels of the heart    Blood dyscrasia, such anemia, leukemia, lymphoma, or myeloma    Bone marrow depression    Catecholamine-releasing paraganglioma    Blood clotting disorder    Congenital long QT syndrome    Difficulty urinating or completely emptying your bladder    Uncorrected electrolyte abnormalities    Fungal infection    Gastrointestinal (GI) bleeding    Gastrointestinal (GI) obstruction    G6PD deficiency    Recent heart attack    High blood pressure    Irregular heartbeat or heart rhythm    Kidney disease or hemodialysis    Mononucleosis (mono)    Myasthenia gravis    Parkinson's disease    Pheochromocytoma    Reye syndrome    Seizure disorder    Ulcerative colitis    None of the above   Do you have any of these conditions that can affect the immune system? Scroll to see all options. Select all that apply.    None of these   Not selected:    History of bone marrow transplant    Chronic kidney disease    Chronic liver disease (including cirrhosis)    HIV/AIDS    Inflammatory bowel disease (Crohn's disease or ulcerative colitis)    Lupus    Moderate to severe plaque psoriasis    Multiple sclerosis    Rheumatoid arthritis     Sickle cell anemia    Alpha or beta thalassemia    History of solid organ transplant (kidney, liver, or heart)    History of spleen removal    An autoimmune disorder not listed here    A condition requiring treatment with long-term use of oral steroids (such as prednisone, prednisolone, or dexamethasone)   Have you ever been diagnosed with cancer? Select one.    No   Not selected:    Yes, I have cancer now    Yes, but I'm in remission   Have you ever had either of these conditions? Select all that apply.    No   Not selected:    Metoclopramide-associated dystonic reaction    Tardive dyskinesia   Do any of these apply to the people who live with you? Select all that apply.    None of the above   Not selected:    A child under the age of 5    An adult 65 or older    A person who is pregnant    A person who has given birth, had a miscarriage, had a pregnancy loss, or had an  in the last 2 weeks    An  or Native [place]   Does any member of your household have any of these medical conditions? Select all that apply.    None of the above   Not selected:    Asthma    Disorders of the brain, spinal cord, or nerves and muscles, such as dementia, cerebral palsy, epilepsy, muscular dystrophy, or developmental delay    Chronic lung disease, such as COPD or cystic fibrosis    Heart disease, such as congenital heart disease, congestive heart failure, or coronary artery disease    Cerebrovascular disease, such as stroke or another condition affecting the blood vessels or blood supply to the brain    Blood disorders, such as sickle cell disease    Diabetes    Metabolic disorders such as inherited metabolic disorders or mitochondrial disease    Kidney disorders    Liver disorders    Weakened immune system due to illness or medications such as chemotherapy or steroids    Children under the age of 19 who are on long-term aspirin therapy    Extreme obesity (BMI > 40)   Are you pregnant? Select one.    No   Not  selected:    Yes   When was your last menstrual period? If you don't currently have periods or no longer have periods, please briefly explain.    Last month   Within the last 2 weeks, have you: - Given birth - Had a miscarriage - Had a pregnancy loss - Had an  Being postpartum (live birth or loss) within the last 2 weeks increases your risk of flu complications. Select one.    No   Not selected:    Yes   Are you breastfeeding? Select one.    No   Not selected:    Yes   Just a few more questions about medications, and then you're finished. Have you used any non-prescription medications or nasal sprays for your current symptoms? Examples include saline sprays, decongestants, NyQuil, and Tylenol. Select one.    No   Not selected:    Yes   In the past month, have you taken antibiotics for similar symptoms? Examples of antibiotics include amoxicillin, amoxicillin-clavulanate (Augmentin), penicillin, cefdinir (Omnicef), doxycycline, and clindamycin (Cleocin). Select one.    No   Not selected:    Yes    I'm not sure   Have you taken any monoamine oxidase inhibitor (MAOI) medications in the last 14 days? Examples include rasagiline (Azilect), selegiline (Eldepryl, Zelapar), isocarboxazid (Marplan), phenelzine (Nardil), and tranylcypromine (Parnate). Select one.    No   Not selected:    Yes    I'm not sure   Do you take Kynmobi or Apokyn (apomorphine)? Select one.    No   Not selected:    Yes    I'm not sure   Are you taking any other medications or supplements? On the next screen, you need to list all vitamins, supplements, non-prescription medications (such as aspirin or Aleve), and prescription medications that you're taking. Select one.    Yes   Not selected:    Yes, but I'm not sure what they are    No   Have you ever had an allergic or bad reaction to any medication? Select one.    No   Not selected:    Yes   Are you allergic to milk or to the proteins found in milk (for example, whey or casein)? A milk allergy  is different from lactose intolerance. Select one.    No   Not selected:    Yes    I'm not sure   Have you ever had jaundice or liver problems as a result of taking amoxicillin-clavulanate (Augmentin)? Jaundice is a condition in which the skin and the whites of the eyes turn yellow. Select all that apply.    No, not that I know of   Not selected:    Yes, jaundice    Yes, liver problems   Have you ever had jaundice or liver problems as a result of taking azithromycin (Zithromax, Zmax)? Jaundice is a condition in which the skin and the whites of the eyes turn yellow. Select all that apply.    No, not that I know of   Not selected:    Yes, jaundice    Yes, liver problems   Do you need a doctor's note? A doctor's note confirms that you received care today and states when you can return to school or work. It does not contain information about your diagnosis or treatment plan. Your provider will make the final decision on whether to give you a doctor's note and for how long. Doctor's notes CANNOT be backdated. We can't provide medical leave paperwork through this type of visit. If more paperwork is needed to request time off, contact your primary care provider. Select one.    Today only (1 day)   Not selected:    Today and tomorrow (2 days)    3 days    7 days    10 days    14 days    No   Is there anything else you'd like to tell us about your symptoms?    I feel like it is maybe a sinus infection but I am not sure.   ----------   Medical history   Medical history data does not currently exist for this patient.

## 2022-01-29 NOTE — EXTERNAL PATIENT INSTRUCTIONS
View Doctor's Note     Note   Bromfed has been ordered for you. It is recommended that you have a Covid test. One has been ordered for you to have at any [facility]. The results will come to your MyChart, so what for notifications and answer any calls from a blocked number, as we will be calling you with results.   Diagnosis   Viral upper respiratory infection (URI)   My name is Heidy Hawthorne, and I'm a healthcare provider at [facility]. I've reviewed your interview and based on your responses, I see that you have a viral upper respiratory infection. However, the exact type of virus causing your illness isn't clear.   The start of cold and flu season, coupled with the ongoing COVID-19 pandemic, makes it hard to tell the difference between the common cold, the flu, or a COVID-19 infection. These illnesses share many of the same symptoms, including fever, fatigue, muscle and/or body aches, sore throat, runny/stuffy nose, and cough.   Most people with any of these illnesses have mild to moderate symptoms and can rest at home until they get better.   I've given you a doctor's note for 2 days.   I haven't prescribed any antibiotics. Antibiotics fight bacteria, not viruses. They don't help when you have a viral infection like the common cold, the flu, or a COVID-19 infection. Antibiotics could even make you feel worse as they can cause or worsen nausea, diarrhea, and stomach pain.    Stay home   While you're recovering, STAY HOME. Do not leave your home except to get medical care.   While at home, avoid close contact with others.   If possible, stay in a room away from other people in your home, and use a separate bathroom.   Wear a face mask when you can't avoid contact with other people.   If you can't wear a face mask because of breathing difficulty, your caregiver should wear a face mask.   Wearing a face mask does NOT mean you can leave your home. You must continue to stay home until you have  "recovered.   You can return to your normal activities when ALL of the following are true:    You've been fever-free for at least 24 hours (1 full day) without using fever-reducing medications such as Tylenol    Your symptoms have improved    It's been at least 5 full days since your symptoms first started   Prevention    Cover your mouth and nose with a tissue when you cough or sneeze. Throw used tissues in a lined trash can right away, and wash your hands immediately after.    Avoid sharing personal items like dishes, utensils, towels, or bedding. Wash items thoroughly with soap and water after use.    Wash your hands often with soap and water for at least 20 seconds. If soap and water are not available, clean your hands with a hand  that contains at least 60% alcohol. Cover all surfaces of your hands and rub them together until they feel dry. Here's a simple recipe for homemade hand :    2 parts 95% non-denatured ethanol alcohol or 99% to 100% isopropyl alcohol    1 part moisturizer such as Aloe Vera or 100% glycerol    Avoid touching your face, especially their eyes, nose, and mouth.    Clean \"high-touch\" surfaces daily. \"High-touch\" surfaces include counters, tabletops, doorknobs, bathroom fixtures, toilets, phones, keyboards, tablets, and bedside tables. You can use soap, detergents, 60%-80% ethanol or isopropyl alcohol,  such as Windex, or bleach. All of these  are effective at killing the virus that causes COVID-19.    Limit contact with pets and other animals while sick. If you must care for your pet, wash your hands before and after you interact with them and wear a face mask.   What to expect   Follow the advice in the treatment section below and you should feel better within 7 to 14 days. You may continue to feel tired and have a cough for several weeks.   The medications recommended here can help ease your symptoms while your immune system fights the virus.   About " your diagnosis   The common cold, the flu (influenza), and COVID-19 are respiratory illnesses that are caused by viruses. While the specific type of virus that causes these infections is different, the symptoms can often be similar. Fortunately, most people who get these infections have mild symptoms and can rest at home until they get better. For elderly people and those with chronic medical problems, these infections can be serious or life-threatening.   When to seek care   Call us at [phone]   with any sudden or unexpected symptoms.   Call your healthcare provider immediately if you have any of the following:    Fever over 103F    Fever that doesn't come down when you take Tylenol or ibuprofen    Fever that returns after being gone for more than 24 hours    Fever for more than 4 days    Worsening shortness of breath or difficulty breathing   Go to your nearest ER or call 911 if you have any of the following:    Shortness of breath that makes it difficult to do simple things like get dressed, bathe, or comb your hair    Persistent chest pain or chest tightness    New confusion or difficulty staying alert    Bluish color to the lips or face   Other treatment    Rest and drink plenty of sugar-free fluids.    Use a clean humidifier or a cool-mist vaporizer in your room at night. Breathing humid air may help with nasal congestion.    Try using a Neti Pot to flush out your stuffy nose and sinuses. Neti Pots are available at any drugstore without a prescription.    Avoid smoke and air pollution. Smoke can make infections worse.    Coronavirus (COVID-19) information   Common symptoms of COVID-19 include fever, cough, shortness of breath, fatigue, muscle or body aches, headaches, new loss of sense of taste or smell, sore throat, stuffy or runny nose, nausea or vomiting, and diarrhea. Most people who get COVID-19 have mild symptoms and can rest at home until they get better. Elderly people and those with chronic  medical problems may be at risk for more serious complications.   FAQs about the COVID-19 vaccine   There are three authorized COVID-19 vaccines: [employer] Julianna Vaccine (J&J/Julianna), Moderna, and Pfizer-BioNTech (Pfizer). The J&J/Julianna and Moderna vaccines are approved for use in people aged 18 and older. The Pfizer vaccine is approved for those aged 5 and older. All three are available at no cost. Even if you don't have health insurance, you can still get the COVID-19 vaccine for free.   Which vaccine is the best? Which vaccine should I get?   All three vaccines are highly effective. Even if you get COVID after being vaccinated, all of the vaccines help prevent severe disease, hospitalization, and complications.   Most people should get whichever vaccine is first available to them. However, women younger than 50 years old should consider the rare risk of blood clots with low platelets after vaccination with the J&J/Julianna vaccine. This risk hasn't been seen with the other two vaccines.   Are the vaccines safe?   Yes. Hundreds of millions of people in the [place] have already safely received COVID-19 vaccines. As part of Phase 3 clinical trials in the [place] and other countries, researchers collected safety and efficacy data for all three vaccines. These clinical trials follow strict standards. Before a vaccine is approved, the manufacturing company must submit data to the Food and Drug Administration (FDA) for review. Tens of thousands of volunteers participated in the clinical trials for the vaccines. The FDA continues to monitor safety data as the vaccines are given to the general population.   Do I need the vaccine if I've already had COVID?   Yes. Vaccination helps protect you even if you've already had COVID.   If you had COVID-19 and had symptoms, wait to get vaccinated until ALL of the following are true:    5 full days since your symptoms started    24 hours with no fever, without the use of  fever-reducing medications    Your other COVID-19 symptoms are improving   If you tested positive for COVID-19 but did not have symptoms, you can get vaccinated after 5 full days have passed since you had a positive test, as long as you don't develop symptoms.   If you had COVID-19 and were treated with monoclonal antibodies, you should wait 90 days before getting a COVID-19 vaccination.   How many doses of the vaccine do I need?   It depends on your age, which vaccine you get, and any underlying medical conditions you may have.   To be fully vaccinated, you need to complete the primary series of COVID-19 vaccines. This means:   One dose of the J&J/Julianna vaccine   or   Two doses of the Moderna vaccine, spaced 4 weeks apart   or   Two doses of the Pfizer vaccine, spaced 3 weeks apart   If you're immunocompromised, the primary series may include a third dose of the Moderna or Pfizer vaccine, given 28 days after the second dose.   To be up to date on vaccines, you need to get all of the recommended COVID-19 vaccines, including boosters. People who are up to date have the best protection against a COVID-19 infection.   I'm immunocompromised. Do I qualify for a third dose?   If any of these situations apply, you have a moderately to severely weakened immune system:    You're getting active treatment for a cancer or tumor of the blood    You've had an organ transplant and are taking medicine to suppress your immune system    You've had a stem cell transplant within the last 2 years    You're taking medicine to suppress your immune system, such as high-dose corticosteroids    You have moderate to severe primary immunodeficiency (such as DiGeorge syndrome, Wiskott-Tiverton syndrome)    You have advanced or untreated HIV infection   If you got the Pfizer vaccine and are 5 years old or older, AND it's been at least 28 days since your second shot, you should get an additional primary shot of the Pfizer vaccine.   If you got  the Moderna vaccine and are 18 years old or older, AND it's been at least 28 days since your second shot, you should get an additional primary shot of the Moderna vaccine.   If you got the J&J/Julianna vaccine, no additional primary shot is recommended at this time.   If you think you need an additional primary shot, speak with your care team.   I'm fully vaccinated. Do I need a booster shot?   Everyone 12 and older should get a booster shot. Immunity from vaccinations can decrease over time, and a booster renews the effect of the vaccination. If you're 12 to 17 years old, you can get the Pfizer booster. If you're 18 or older, you can get the Pfizer or Moderna booster.   If you got the J&J/Julianna vaccine AND it's been at least 2 months since your shot, you should get a booster shot now.   If you got the Pfizer vaccine AND it's been at least 5 months since your second dose, you should get a booster shot now.   If you got the Moderna vaccine AND it's been at least 5 months since your second dose, you should get a booster shot now.   If you'd like more information on where and how to get a booster shot, speak with your care team.   What are the common side effects of the vaccine?   A sore arm, tiredness, headache, and muscle pain may occur within two days of getting the vaccine and last a day or two. For the Moderna or Pfizer vaccines, side effects are more common after the second dose. People over the age of 55 are less likely to have side effects than younger people.   After I'm up to date on vaccines, can I still get or spread COVID?   Yes, but your disease should be milder. And your risk of serious illness, hospitalization, and complications will be much lower, especially if you're up to date. Being vaccinated reduces the risk of spreading the disease if you get it.   After I'm up to date on vaccines, can I go back to normal?   You should still wear a mask indoors in public if:    It's required by laws, rules,  regulations, or local guidance.    You have a weakened immune system.    Your age puts you at increased risk of severe disease.    You have a medical condition that puts you at increased risk of severe disease.    Someone in your household has a weakened immune system, is at increased risk for severe disease, or is unvaccinated.    You're in an area of high transmission.   For travel information, see the CDC's latest guidance at https://www.cdc.gov  .   Everyone who tests positive for COVID, regardless of vaccination or booster status, should:    Stay home for at least 5 days. Day 1 is the first full day after your symptoms started. If you never develop symptoms, day 1 is the first full day after the sample was taken for the test.    If you have symptoms, continue to stay home until you're fever free for 24 hours without the use of fever-reducing medicines and your other symptoms have improved.    If you never develop symptoms, you may leave your house after day 5.    You should wear a well-fitting mask around others at home and in public until day 10, even if you don't have symptoms or your symptoms are improving.    Don't travel until after at least day 10.    Don't go to places where you can't wear a mask, such as restaurants, bars, or gyms.   If you're exposed to COVID, follow the advice below based on your vaccination status.   If any of these apply:    You're 18 or older and have had all recommended vaccine doses, including boosters and additional primary shots for certain immunocompromised people    You're 5 to 17 years old and have had 2 doses of the Pfizer vaccine    You've had COVID-19 within the last 90 days, confirmed by a nose or throat swab test   Then:    Wear a well-fitting mask around others for 10 days. The date of last close contact is considered day 0.    Get tested at least 5 days after you last had close contact with someone with COVID-19.   If any of these apply:    You're 18 or older, have  had two doses of the Pfizer or Moderna vaccine, but have NOT gotten a recommended booster shot    You got a dose of the J&J/[employer] vaccine more than 2 months ago, but you have NOT gotten a recommended booster shot    You're not vaccinated   Then:    Stay home for at least 5 days after your last close contact with a person who has COVID-19. The date of last close contact is considered day 0.    Wear a well-fitting mask for 10 days when around others at home or in public.    Watch for fever, cough, shortness of breath, or other COVID-19 symptoms for 10 days after your last close contact with someone with COVID-19.    If you develop symptoms, get tested right away.    If you don't develop symptoms, get tested at least 5 days after your last close contact with someone with COVID-19.   For more information about exposure, isolation, and quarantine, see https://www.cdc.gov/coronavirus/2019-ncov/your-health/quarantine-isolation.html  .    Flu vaccine information   Getting a flu vaccine this year is more important than ever. The vaccine not only protects you and the people around you from the flu, it also helps reduce the strain on healthcare systems responding to the COVID-19 pandemic.   Who should get a flu vaccine?   Everyone 6 months of age and older should get a yearly flu vaccine.   When should I get vaccinated?   You should get a flu vaccine by the end of October. Once you're vaccinated, it takes about two weeks for antibodies to develop and protect you against the flu. That's why it's important to get vaccinated as soon as possible.   After October, is it too late to get vaccinated?   No. You should still get vaccinated. As long as the flu viruses are still in your community, flu vaccines will remain available, even into January of next year or later.   Why do I need a flu vaccine EVERY year?   Flu viruses are constantly changing, so flu vaccines are usually updated from one season to the next. Your protection from  the flu vaccine also lessens over time.   Is the flu vaccine safe?   Yes. Over the last 50 years, hundreds of millions of Americans have safely received the flu vaccines.   What are the side effects of flu vaccines?   You CANNOT get the flu from a flu vaccine. Common side effects of the flu shot include soreness, redness and/or swelling where the shot was given, low grade fever, and aches. Common side effects of the nasal spray flu vaccine for adults include runny nose, headaches, sore throat, and cough. For children, side effects include wheezing, vomiting, muscle aches, and fever.   Does the flu vaccine increase your risk of getting COVID-19?   No. There is no evidence that getting a flu vaccine increases your risk of getting COVID-19.   Is it safe to get the flu vaccine along with a COVID-19 vaccine?   Yes. It's safe to get the flu vaccine with a COVID vaccine or booster.   Contact your healthcare provider TODAY for details on when and where to get your flu vaccine.   Your provider   Your diagnosis was provided by Heidy Hawthorne, a member of your trusted care team at [facility].   If you have any questions, call us at [phone]  .   View Doctor's Note     Expires on 02/28/22

## 2022-02-11 ENCOUNTER — TELEMEDICINE (OUTPATIENT)
Dept: FAMILY MEDICINE CLINIC | Facility: TELEHEALTH | Age: 42
End: 2022-02-11

## 2022-02-11 ENCOUNTER — E-VISIT (OUTPATIENT)
Dept: FAMILY MEDICINE CLINIC | Facility: TELEHEALTH | Age: 42
End: 2022-02-11

## 2022-02-11 DIAGNOSIS — Z20.822 SUSPECTED COVID-19 VIRUS INFECTION: Primary | ICD-10-CM

## 2022-02-11 PROCEDURE — U0004 COV-19 TEST NON-CDC HGH THRU: HCPCS | Performed by: NURSE PRACTITIONER

## 2022-02-11 PROCEDURE — 99212 OFFICE O/P EST SF 10 MIN: CPT | Performed by: NURSE PRACTITIONER

## 2022-02-11 NOTE — PATIENT INSTRUCTIONS

## 2022-02-11 NOTE — E-VISIT ESCALATED
Patient escalated   Provider Antonella Armstrong chose to escalate patient to another level of care because: Patient needs a lab test   Patient was sent the following message:   Based on the information you've provided us, you need to take another step to get care.   What to do now:    Please set up a video visit  .   You won't be charged for your eVisit. If you paid with a credit card, the charge will be reversed.   Chief Complaint: Coronavirus (COVID-19), cold, sinus pain, allergy, or flu   Patient introduction   Patient is 41-year-old female who reports cough, congestion, rhinitis, headache, sweats, and nausea or vomiting that started < 48 hours ago.   Patient has not requested COVID testing.   COVID-19 exposure, testing history, and vaccination status:    No known exposure to a confirmed or suspected case of COVID-19.    No recent travel outside of their local community.    Patient had a viral lab test 1-3 months ago. Test result was negative.    Reports receiving 2 doses of the COVID-19 vaccine (Pfizer, Pfizer). Received their most recent dose of the vaccine > 14 days ago.   Risk factors for severe disease from COVID-19 infection:    BMI >= 25    Diabetes   Warning. The following may warrant further investigation:    Headache described as severe (7-9 on a scale of 1-10)   When asked why they're seeking care online today, patient reports they want to know if they have a cold or something more serious, need a doctor's note, and just want to feel better.   Patient requests a 1-day excuse note.   General presentation   Symptoms came on suddenly.   Fever:    Denies fever.   Sinus and nasal symptoms:    Reports rhinitis.    Reports clear nasal drainage.    Nasal drainage is thick.    Reports postnasal drip.    Reports 1-3 episodes of antibiotic treatment for sinus infection in the last year.    Reports congestion with sinus pain or pressure on or around their nose.    Patient first noticed sinus pain < 5 days ago.     Sinus pain is worse with Valsalva.    Denies itchy nose or sneezing.    Denies history of unhealed nasal septal ulcer/nasal wound.    Denies history of deviated septum or nasal polyps.   Sore throat:    Denies sore throat.   Head and body aches:    Reports headache, described as severe (7-9 on a scale of 1-10).    Reports sweats.    Denies chills.    Denies myalgia.    Denies fatigue.   Dizziness:    Denies dizziness.   Cough:    Reports cough.    Cough is worse in the morning.    Cough is not productive of sputum.   Wheezing and SOB:    Reports previous albuterol inhaler use during URIs, bronchitis, or pneumonia.    Reports previous steroid inhaler use during URIs, bronchitis, or pneumonia.    Not using an albuterol inhaler for current symptoms because they don't feel they need it.    Denies COPD diagnosis.    Denies asthma diagnosis.    Denies wheezing.    Denies shortness of breath.   Chest pain:    Denies chest pain.   Allergies:    Reports history of allergies.    Patient has known seasonal allergies and known dust allergies.   Flu exposure:    Denies recent exposure to confirmed flu diagnosis.    Denies receiving a flu vaccine this season.   Patient denies the following red flags:    Changes in alertness or awareness    Symptoms suggesting intracranial hemorrhage    Decreased urination   Risk factors for antibiotic resistance:    Diabetes   Pregnancy/menstrual status/breastfeeding:    Denies being pregnant    Denies breastfeeding    Regarding last menstrual period, patient writes: Last week ago   Self-exam:    No difficulty moving their chin toward their chest    Neck lymph nodes feel normal    Denies antibiotic treatment for similar symptoms within the past month   Current medications   Denies taking over-the-counter medication for current symptoms.   Denies taking other medications or supplements.   Medication allergies   None.   Medication contraindication review   Reports history positive for depression.  Therefore, the following medication(s) will not be prescribed:    Metoclopramide   Denies history of metoclopramide-associated dystonic reaction and tardive dyskinesia.   No known history of amoxicillin-clavulanate-associated cholestatic jaundice or hepatic impairment.   No known history of azithromycin-associated cholestatic jaundice or hepatic impairment.   Past medical history   Immune conditions: Denies immunocompromising conditions. Denies history of cancer.   Social history   Non-smoker.   Assessment:   Patient determined to need a level of care not appropriate to be delivered through eVisit.   Plan:   Patient informed of need to seek in-person care      ----------   Electronically signed by MAYURI Jarvis on 2022-02-11 at 04:41AM   ----------   Patient Interview Transcript:   Why are you getting care through eVisit today? We can't guarantee a specific treatment or test. Your provider will decide what's best for you. Select all that apply.    I want to know if I have a cold or something more serious    I need a doctor's note    I just want to feel better!   Not selected:    I want to know if I need to be seen by a provider    I want to be tested for COVID-19    I want to get the COVID-19 vaccine    I think I'm having side effects from the COVID-19 vaccine    Other (specify on next screen)   COVID-19 symptoms are similar to symptoms of other illnesses. This makes it difficult to diagnose. Please carefully consider each question and answer as best you can. This will help your provider make the right diagnosis. Which of these symptoms are bothering you? Select all that apply.    Cough    Stuffed-up nose or sinuses    Runny nose    Headache    Sweats    Nausea or vomiting   Not selected:    Shortness of breath    Fever    Itchy or watery eyes    Itchy nose or sneezing    Loss of smell or taste    Sore throat    Hoarse voice or loss of voice    Chills    Muscle or body aches    Fatigue or tiredness     Diarrhea    I don't have any of these symptoms   Do you have any of these conditions? These conditions can put you at increased risk for severe disease if you're infected with COVID-19. Select all that apply.    None of the above   Not selected:    Chronic lung disease, such as cystic fibrosis or interstitial fibrosis    Heart disease, such as congenital heart disease, congestive heart failure, or coronary artery disease    Disorder of the brain, spinal cord, or nerves and muscles, such as dementia, cerebral palsy, epilepsy, muscular dystrophy, or developmental delay    Metabolic disorder or mitochondrial disease    Cerebrovascular disease, such as stroke or another condition affecting the blood vessels or blood supply to the brain    Down syndrome    Mood disorder, including depression or schizophrenia spectrum disorders    Substance use disorder, such as alcohol, opioid, or cocaine use disorder    Tuberculosis   Do you live in a group care setting? Examples include: - Nursing home - Residential care - Psychiatric treatment facility - Group home - [facility] - Board and care home - Homeless shelter - Foster care setting Select one.    No   Not selected:    Yes   Have you ever been tested for COVID-19? Select one.    Yes   Not selected:    No   When was your most recent COVID-19 test? Select one.    1 to 3 months ago   Not selected:    Within the last week (specify date in MM/DD/YYYY format)    7 to 14 days ago    15 to 30 days ago    More than 3 months ago   What type of COVID-19 test did you most recently have? There are two types of COVID-19 tests: - Viral tests check if you're currently infected with COVID-19. For these tests, a nose swab or saliva sample is taken. Viral tests include self-tests and tests done at a doctor's office, lab, or testing site. - Antibody tests check if you've been infected in the past. For these tests, your blood is drawn. Antibody tests can only be done at a doctor's office, lab, or  "testing site. Select one.    Viral test at a doctor's office, lab, or testing site   Not selected:    Viral self-test    Antibody test   What was the result of your most recent COVID-19 test? Select one.    Negative   Not selected:    Positive    I'm not sure   Have you gotten the COVID-19 vaccine? Select one.    Yes   Not selected:    No   How many doses of the COVID-19 vaccine have you gotten? This includes boosters. Select one.    2 doses   Not selected:    1 dose    3 doses   Which COVID-19 vaccine did you get for your first dose? Check your Vaccination Record Card under Product Name/. Select one.    Pfizer-BioNTech (Pfizer)   Not selected:    Max & Max's Julianna Vaccine (J&J/Julianna)    Moderna   Which COVID-19 vaccine did you get for your second dose? Check your Vaccination Record Card under Product Name/. Select one.    Pfizer-BioNTech (Pfizer)   Not selected:    Max & Max's Julianna Vaccine (J&J/Julianna)    Moderna   When did you get your most recent dose of the COVID-19 vaccine?    More than 14 days ago   Not selected:    Less than 48 hours (2 days) ago    48 to 72 hours (3 days) ago    3 to 5 days ago    5 to 7 days ago    7 to 14 days ago   In the last 14 days, have you traveled outside of your local community? This includes travel by car, RV, bus, train, or plane. Travel increases your chances of getting and spreading COVID-19. Select one.    No   Not selected:    Yes   In the last 14 days, have you had close contact with someone who has coronavirus (COVID-19)? \"Close contact\" means any of these: - Living in the same household as someone with COVID-19. - Caring for someone with COVID-19. - Being within 6 feet of someone with COVID-19 for a total of at least 15 minutes over a 24-hour period. For example, three 5-minute exposures for a total of 15 minutes. - Being in direct contact with respiratory droplets from someone with COVID-19 (being coughed on, kissing, " sharing utensils). Select one.    No, not that I know of   Not selected:    Yes, a confirmed case    Yes, a suspected case   When did your current symptoms start? If you know the exact date your symptoms started, choose Other and enter the month and day. Select one.    Less than 48 hours ago   Not selected:    3 to 5 days ago    6 to 9 days ago    10 to 14 days ago    2 to 4 weeks ago    More than a month ago    Other (specify)   Did your symptoms come on suddenly or gradually? Select one.    Suddenly   Not selected:    Gradually    I'm not sure   You mentioned having a headache. On a scale of 1 to 10, how severe is your headache pain? Select one.    Severe (7 to 9)   Not selected:    Mild (1 to 3)    Moderate (4 to 6)    Unbearable (10)    The worst headache of my life (10+)   Do you cough so hard that it's made you gag or vomit? By gag, we mean has your coughing made you choke or dry heave? Select all that apply.    No   Not selected:    Yes, my coughing has made me gag    Yes, my coughing has made me vomit   When is your cough the worst? Select all that apply.    In the morning, or when I wake up   Not selected:    During the day    At nighttime, or while I'm sleeping    I'm not sure   Are you coughing up mucus or phlegm? Select one.    No, my cough is dry   Not selected:    Yes, a little    Yes, a lot   You mentioned having a stuffy nose or sinus congestion. Do you feel pain or pressure in your sinuses?    Yes   Not selected:    No    I'm not sure   Where do you feel sinus pain or pressure?    Behind my nose   Not selected:    In my forehead    Around my eyes    In my cheeks    In my upper teeth or jaw    I'm not sure   When did you first notice your sinus pain or pressure? Select one.    Less than 5 days ago   Not selected:    5 to 9 days ago    10 to 14 days ago    2 to 4 weeks ago    1 month ago or longer   Does coughing, sneezing, or leaning forward make your sinuses feel worse? Select one.    Yes   Not  "selected:    No    I'm not sure   What color is your nasal drainage? Select one.    Clear   Not selected:    White    Yellow    Green    My nose is stuffed but not draining or running    I'm not sure   Is your nasal drainage thick or thin? Select one.    Thick   Not selected:    Thin    I'm not sure   Is there any drainage (mucus) going down the back of your throat? This kind of drainage is also called \"postnasal drip.\" Select one.    Yes   Not selected:    No    I'm not sure   Since your symptoms started, have you felt dizzy? Select one.    No   Not selected:    Yes, but I can continue with my regular daily activities    Yes, and it makes it hard to stand, walk, or do daily activities   Do you have chest pain? You might also feel it as discomfort, aching, tightness, or squeezing in the chest. Select one.    No   Not selected:    Yes   Have you urinated at least 3 times in the last 24 hours? Select one.    Yes   Not selected:    No    I'm not sure   Changes in alertness or awareness may mean you need emergency care. Since your symptoms started, have you had any of these? Select all that apply.    None of the above   Not selected:    Confusion    Slurred speech    Not knowing where you are or what day it is    Difficulty staying conscious    Fainting or passing out   Do your symptoms include a whistling sound, or wheezing, when you breathe? Select one.    No   Not selected:    Yes    I'm not sure   Do you have any of these symptoms in your ear(s)? Select all that apply.    Plugged or blocked sensation   Not selected:    Pain    Pressure    Fullness    Crackling or popping    None of the above   Can you move your chin toward your chest?    Yes   Not selected:    No, my neck is too stiff   Are your glands/lymph nodes swollen, or does it hurt when you touch them?    No   Not selected:    Yes    I'm not sure   People with a very high body mass index (BMI) are at higher risk for developing complications from the flu and " severe illness from COVID-19. To determine your BMI, we need to know your weight and height. Please enter your weight (in pounds).    Weight   Please enter your height.    Height   In the past week, has anyone around you (such as at school, work, or home) had a confirmed diagnosis of the flu? A confirmed diagnosis means that a nose swab was done to verify a flu infection. Select all that apply.    No   Not selected:    I live with someone who has the flu    I've been within touching distance of someone who has the flu    I've walked by, or sat about 3 feet away from, someone who has the flu    I've been in the same building as someone who has the flu    I'm not sure   Have you ever been diagnosed with asthma? Select one.    No   Not selected:    Yes   Have you ever been prescribed albuterol to use for wheezing, cough, or shortness of breath caused by a cold, bronchitis, or pneumonia? Albuterol (ProAir, Proventil, Ventolin) is prescribed as an inhaler or a solution to be used with a nebulizer machine. Select one.    Yes   Not selected:    No    I'm not sure   Have you ever been prescribed a steroid inhaler to use for wheezing, cough, or shortness of breath caused by a cold, bronchitis, or pneumonia? Some examples of steroid inhalers include Pulmicort, Flovent, Qvar, and Alvesco. Select one.    Yes   Not selected:    No    I'm not sure   Have you used albuterol for your current symptoms? This includes both albuterol inhalers and albuterol solution in a nebulizer machine. Select one.    No, I don't feel like I need it   Not selected:    Yes    No, I don't have any, or it's    Would you like a prescription for albuterol? Select one.    No   Not selected:    Yes   Have you ever been diagnosed with chronic obstructive pulmonary disease (COPD)? Select one.    No   Not selected:    Yes    I'm not sure   In the last year, how many times were you treated with antibiotics for a sinus infection? Select one.    1 to 3  times   Not selected:    None    4 or more times   Have you been diagnosed with a deviated septum or nasal polyps? The nose is divided into two nostrils by the septum. A crooked septum is called a deviated septum. Nasal polyps are growths inside the nose or sinuses. Select one.    No   Not selected:    Yes, but I had surgery to treat them    Yes, I have a deviated septum    Yes, I have nasal polyps    Yes, I have a deviated septum and nasal polyps    I'm not sure   Do you have a sore inside your nose that won't heal? Select one.    No   Not selected:    Yes    I'm not sure   Do you have allergies (pollen, dust mites, mold, animal dander)? Select one.    Yes   Not selected:    No    I'm not sure   What kind of allergies do you have? Select all that apply.    Seasonal allergies (hay fever)    Dust allergies   Not selected:    Pet allergies    None of the above    I'm not sure   Do you think your symptoms could be allergy-related? Select one.    I'm not sure   Not selected:    Yes    No   Have you had a flu shot this season? Select one.    No   Not selected:    Yes, less than 2 weeks ago    Yes, 2 to 4 weeks ago    Yes, 1 to 3 months ago    Yes, 3 to 6 months ago    Yes, more than 6 months ago    I'm not sure   The flu and COVID-19 can be more serious for people with certain conditions or characteristics. These questions help us figure out if you or anyone you live with is at higher risk for complications from these infections. Do either of these statements apply to you? Select all that apply.    None of the above   Not selected:    I'm  or Native [place]    I'm a healthcare worker   Do you smoke tobacco? Select one.    No, never   Not selected:    Yes, every day    Yes, some days    No, I quit   Some conditions can put you at risk for more serious infections. Do any of these apply to you? Select all that apply.    I have diabetes   Not selected:    I've been hospitalized within the last 5 days    I'm in  close contact with a child in     None of the above   Are you currently being treated for any of these conditions? Scroll to see all options. Select all that apply.    Depression   Not selected:    Aspirin triad (also known as Samter's triad or ASA triad)    Asthma or hives from taking aspirin or other NSAIDs, such as ibuprofen or naproxen    Blockage or narrowing of the blood vessels of the heart    Blood dyscrasia, such anemia, leukemia, lymphoma, or myeloma    Bone marrow depression    Catecholamine-releasing paraganglioma    Blood clotting disorder    Congenital long QT syndrome    Difficulty urinating or completely emptying your bladder    Uncorrected electrolyte abnormalities    Fungal infection    Gastrointestinal (GI) bleeding    Gastrointestinal (GI) obstruction    G6PD deficiency    Recent heart attack    High blood pressure    Irregular heartbeat or heart rhythm    Kidney disease or hemodialysis    Mononucleosis (mono)    Myasthenia gravis    Parkinson's disease    Pheochromocytoma    Reye syndrome    Seizure disorder    Ulcerative colitis    None of the above   Do you have any of these conditions that can affect the immune system? Scroll to see all options. Select all that apply.    None of these   Not selected:    History of bone marrow transplant    Chronic kidney disease    Chronic liver disease (including cirrhosis)    HIV/AIDS    Inflammatory bowel disease (Crohn's disease or ulcerative colitis)    Lupus    Moderate to severe plaque psoriasis    Multiple sclerosis    Rheumatoid arthritis    Sickle cell anemia    Alpha or beta thalassemia    History of solid organ transplant (kidney, liver, or heart)    History of spleen removal    An autoimmune disorder not listed here    A condition requiring treatment with long-term use of oral steroids (such as prednisone, prednisolone, or dexamethasone)   Have you ever been diagnosed with cancer? Select one.    No   Not selected:    Yes, I have cancer  now    Yes, but I'm in remission   Have you ever had either of these conditions? Select all that apply.    No   Not selected:    Metoclopramide-associated dystonic reaction    Tardive dyskinesia   Do any of these apply to the people who live with you? Select all that apply.    None of the above   Not selected:    A child under the age of 5    An adult 65 or older    A person who is pregnant    A person who has given birth, had a miscarriage, had a pregnancy loss, or had an  in the last 2 weeks    An  or Native [place]   Does any member of your household have any of these medical conditions? Select all that apply.    None of the above   Not selected:    Asthma    Disorders of the brain, spinal cord, or nerves and muscles, such as dementia, cerebral palsy, epilepsy, muscular dystrophy, or developmental delay    Chronic lung disease, such as COPD or cystic fibrosis    Heart disease, such as congenital heart disease, congestive heart failure, or coronary artery disease    Cerebrovascular disease, such as stroke or another condition affecting the blood vessels or blood supply to the brain    Blood disorders, such as sickle cell disease    Diabetes    Metabolic disorders such as inherited metabolic disorders or mitochondrial disease    Kidney disorders    Liver disorders    Weakened immune system due to illness or medications such as chemotherapy or steroids    Children under the age of 19 who are on long-term aspirin therapy    Extreme obesity (BMI > 40)   Are you pregnant? Select one.    No   Not selected:    Yes   When was your last menstrual period? If you don't currently have periods or no longer have periods, please briefly explain.    Last week ago   Within the last 2 weeks, have you: - Given birth - Had a miscarriage - Had a pregnancy loss - Had an  Being postpartum (live birth or loss) within the last 2 weeks increases your risk of flu complications. Select one.    No   Not  selected:    Yes   Are you breastfeeding? Select one.    No   Not selected:    Yes   Just a few more questions about medications, and then you're finished. Have you used any non-prescription medications or nasal sprays for your current symptoms? Examples include saline sprays, decongestants, NyQuil, and Tylenol. Select one.    No   Not selected:    Yes   In the past month, have you taken antibiotics for similar symptoms? Examples of antibiotics include amoxicillin, amoxicillin-clavulanate (Augmentin), penicillin, cefdinir (Omnicef), doxycycline, and clindamycin (Cleocin). Select one.    No   Not selected:    Yes    I'm not sure   Have you taken any monoamine oxidase inhibitor (MAOI) medications in the last 14 days? Examples include rasagiline (Azilect), selegiline (Eldepryl, Zelapar), isocarboxazid (Marplan), phenelzine (Nardil), and tranylcypromine (Parnate). Select one.    No   Not selected:    Yes    I'm not sure   Do you take Kynmobi or Apokyn (apomorphine)? Select one.    No   Not selected:    Yes    I'm not sure   Are you taking any other medications or supplements? On the next screen, you need to list all vitamins, supplements, non-prescription medications (such as aspirin or Aleve), and prescription medications that you're taking. Select one.    No   Not selected:    Yes    Yes, but I'm not sure what they are   Have you ever had an allergic or bad reaction to any medication? Select one.    No   Not selected:    Yes   Are you allergic to milk or to the proteins found in milk (for example, whey or casein)? A milk allergy is different from lactose intolerance. Select one.    No   Not selected:    Yes    I'm not sure   Have you ever had jaundice or liver problems as a result of taking amoxicillin-clavulanate (Augmentin)? Jaundice is a condition in which the skin and the whites of the eyes turn yellow. Select all that apply.    No, not that I know of   Not selected:    Yes, jaundice    Yes, liver problems   Have  you ever had jaundice or liver problems as a result of taking azithromycin (Zithromax, Zmax)? Jaundice is a condition in which the skin and the whites of the eyes turn yellow. Select all that apply.    No, not that I know of   Not selected:    Yes, jaundice    Yes, liver problems   Do you need a doctor's note? A doctor's note confirms that you received care today and states when you can return to school or work. It does not contain information about your diagnosis or treatment plan. Your provider will make the final decision on whether to give you a doctor's note and for how long. Doctor's notes CANNOT be backdated. We can't provide medical leave paperwork through this type of visit. If more paperwork is needed to request time off, contact your primary care provider. Select one.    Today only (1 day)   Not selected:    Today and tomorrow (2 days)    3 days    7 days    10 days    14 days    No   Is there anything else you'd like to tell us about your symptoms?   The patient did not enter any additional information.   ----------   Medical history   The following information was received from the EMR on February 11, 2022.   Allergies:    No Known Allergies   Medications:    SUMATRIPTAN SUCCINATE 50 MG PO TABS   - Route: Oral   - Start Date: May 31, 2021   - End Date: None   - Status: active    AZITHROMYCIN 250 MG PO TABS   - Route:   - Start Date: December 22, 2021   - End Date: None   - Status: active    FLUTICASONE PROPIONATE 50 MCG/ACT NA SUSP   - Route: Nasal   - Start Date: August 12, 2020   - End Date: None   - Status: active    METFORMIN  MG PO TABS   - Route:   - Start Date: March 15, 2021   - End Date: None   - Status: active    DICLOFENAC SODIUM 50 MG PO TBEC   - Route: Oral   - Start Date: July 01, 2019   - End Date: None   - Status: active    QUETIAPINE FUMARATE 50 MG PO TABS   - Route: Oral   - Start Date: April 28, 2021   - End Date: None   - Status: active    CITALOPRAM HYDROBROMIDE 20 MG PO  TABS   - Route:   - Start Date: December 02, 2021   - End Date: None   - Status: active    AZELASTINE HCL 0.15 % NA SOLN   - Route: Nasal   - Start Date: December 16, 2021   - End Date: None   - Status: active    FREESTYLE SERENE SENSOR SYSTEM MISC   - Route: Does not apply   - Start Date: January 05, 2022   - End Date: None   - Status: active    EUTHYROX 125 MCG PO TABS   - Route:   - Start Date: December 02, 2021   - End Date: None   - Status: active    TOPIRAMATE 50 MG PO TABS   - Route:   - Start Date: December 02, 2021   - End Date: None   - Status: active    ONDANSETRON 4 MG PO TBDP   - Route:   - Start Date: December 06, 2021   - End Date: None   - Status: active    DICYCLOMINE HCL 20 MG PO TABS   - Route: Oral   - Start Date: March 19, 2020   - End Date: None   - Status: active    ALBUTEROL SULFATE  (90 BASE) MCG/ACT IN AERS   - Route: Inhalation   - Start Date: December 06, 2021   - End Date: None   - Status: active    ONDANSETRON HCL 4 MG PO TABS   - Route: Oral   - Start Date: March 18, 2021   - End Date: None   - Status: active    LORATADINE 10 MG PO CAPS   - Route: Oral   - Start Date: August 12, 2020   - End Date: None   - Status: active   Problem list:    Anxiety and depression   - Category: Problem List Item   - Health Status:   - Start Date: July 01, 2019   - End Date: None   - Status: active    Migraine   - Category: Problem List Item   - Health Status:   - Start Date: July 01, 2019   - End Date: None   - Status: active    Obstructive sleep apnea   - Category: Problem List Item   - Health Status:   - Start Date: July 01, 2019   - End Date: None   - Status: active    Anemia   - Category: Problem List Item   - Health Status:   - Start Date: July 12, 2019   - End Date: None   - Status: active    Diabetes (HCC)   - Category: Problem List Item   - Health Status:   - Start Date: July 12, 2019   - End Date: None   - Status: active    Hypothyroid   - Category: Problem List Item   - Health Status:    - Start Date: July 12, 2019   - End Date: None   - Status: active    Knee pain, right   - Category: Problem List Item   - Health Status:   - Start Date: July 12, 2019   - End Date: None   - Status: active    Malaise and fatigue   - Category: Problem List Item   - Health Status:   - Start Date: July 12, 2019   - End Date: None   - Status: active    Migraine without aura and without status migrainosus, not intractable   - Category: Problem List Item   - Health Status:   - Start Date: July 12, 2019   - End Date: None   - Status: active    Annual physical exam   - Category: Problem List Item   - Health Status:   - Start Date: August 10, 2019   - End Date: None   - Status: active    Essential hypertension   - Category: Problem List Item   - Health Status:   - Start Date: August 10, 2019   - End Date: None   - Status: active    Right hip pain   - Category: Problem List Item   - Health Status:   - Start Date: August 10, 2019   - End Date: None   - Status: active    Right upper quadrant pain   - Category: Problem List Item   - Health Status:   - Start Date: March 19, 2020   - End Date: None   - Status: active    Bacterial respiratory infection   - Category: Problem List Item   - Health Status:   - Start Date: June 01, 2020   - End Date: None   - Status: active    Bipolar disorder, current episode mixed, moderate (HCC)   - Category: Problem List Item   - Health Status:   - Start Date: July 02, 2020   - End Date: None   - Status: active    Flu-like symptoms   - Category: Problem List Item   - Health Status:   - Start Date: July 30, 2020   - End Date: December 16, 2021   - Status: resolved    Viral upper respiratory tract infection   - Category: Problem List Item   - Health Status:   - Start Date: July 31, 2020   - End Date: December 16, 2021   - Status: resolved    Fever   - Category: Problem List Item   - Health Status:   - Start Date: November 06, 2020   - End Date: December 16, 2021   - Status: resolved    Non-recurrent  acute serous otitis media of right ear   - Category: Problem List Item   - Health Status:   - Start Date: December 09, 2020   - End Date: December 16, 2021   - Status: resolved    Symptoms of upper respiratory infection (URI)   - Category: Problem List Item   - Health Status:   - Start Date: March 18, 2021   - End Date: December 16, 2021   - Status: resolved

## 2022-02-11 NOTE — PROGRESS NOTES
You have chosen to receive care through a telehealth visit.  Do you consent to use a video/audio connection for your medical care today? Yes     CHIEF COMPLAINT  Chief Complaint   Patient presents with   • URI         HPI  Roseanna Charles is a 41 y.o. female  presents with complaint of headache, runny nose, nasal congestion, and nausea that started today. Denies fever, chills, earache, sore throat, cough, SOA, or loss of taste or smell. She states she is vaccinated for COVID-19. She states she has taken Tylenol for her symptoms. She is requesting a work excuse for today.    Review of Systems   Constitutional: Negative for activity change, appetite change, chills, fatigue and fever.   HENT: Positive for congestion and rhinorrhea. Negative for ear discharge, ear pain, facial swelling, hearing loss, postnasal drip, sinus pressure, sinus pain, sneezing, sore throat, tinnitus, trouble swallowing and voice change.    Respiratory: Negative for cough, chest tightness, shortness of breath and wheezing.    Cardiovascular: Negative for chest pain.   Gastrointestinal: Positive for nausea. Negative for abdominal pain, diarrhea and vomiting.   Musculoskeletal: Negative for myalgias.   Skin: Negative for rash.   Neurological: Positive for headaches. Negative for dizziness, syncope and light-headedness.   All other systems reviewed and are negative.      Past Medical History:   Diagnosis Date   • Abdominal pain     Impression: This pain is likely due to BV found on her pelvic exam. She is being treated with Flagyl at this time.   • Acute non-recurrent maxillary sinusitis     Impression: She was prescribed Cipro to treat her symptoms. Increase fluids. Tylenol/motrin for pain or fever. Medication and medication adverse effects discussed. Follow-up 5-7 days for reevaluation if not improved or sooner if needed.   • Allergic     Since childhood   • Anemia     Impression: Improved.   • Anxiety and depression     Impression: Stable at  this time. No changes to her medications.   • Bacterial vaginosis     Impression: Due to her complaints of abdominal pain being so low in the suprapubic region she had a pelvic exam which caused pain and discomfort. Her wet prep was positive for clue cells. She was started on Flagyl.   • Chicken pox    • Diabetes (HCC)     Impression: Her bs was 108. A1C will need to be ordered at her next clinic visit.   • Flu-like symptoms     Impression: Due to exposure and symptoms she was sent Tamiflu and given a Depo/Dexa IM inj. Increase fluids. Tylenol/motrin for pain or fever. Medication and medication adverse effects discussed. Follow-up 5-7 days for reevaluation if not improved or sooner if needed.   • History of ulcer disease    • Hypothyroid     Impression: Her TSH was elevated despite having a normal T3 and T4. Her Levothyroxin was increased to decrease her TSH. She will need to followup in 6 wks for a repeat Thyroid panel.   • Knee pain, right     Impression: X-Ray clear. MRI-ordered. She was advised to consider a joint injection.   • Malaise and fatigue     Impression: Unknonwn etiology of symptoms. May be secondary of DM or Hypothyroidism. Blood work was obtained to determine a cause.   • Migraine without aura and without status migrainosus, not intractable     Impression: Worsened. She reports that her HA has not improved. She was advsied to consider restarting Topamax.   • Overweight (BMI 25.0-29.9)    • Pneumonia 12/1/21    Was diagnosed in [facility] ER   • Upper respiratory infection, acute     Impression: She was prescribed Cipro to treat her symptoms. Increase fluids. Tylenol/motrin for pain or fever. Medication and medication adverse effects discussed. Follow-up 5-7 days for reevaluation if not improved or sooner if needed.       Family History   Problem Relation Age of Onset   • Hyperlipidemia Mother    • Diabetes Mother         Diabetes Mellitus   • Heart disease Mother    • Coronary artery disease  Father    • Hypertension Father    • Diabetes Father         Diabetes Mellitus   • Heart disease Father    • COPD Son    • Asthma Son    • Coronary artery disease Paternal Grandmother    • Hypertension Paternal Grandmother    • Diabetes Paternal Grandmother         Diabetes Mellitus   • COPD Paternal Grandmother    • Asthma Paternal Grandmother    • Cancer Paternal Grandmother         Colon   • Heart disease Paternal Grandmother    • COPD Maternal Grandmother            • Developmental Disability Brother        Social History     Socioeconomic History   • Marital status:    Tobacco Use   • Smoking status: Never Smoker   • Smokeless tobacco: Never Used   Substance and Sexual Activity   • Alcohol use: Yes     Alcohol/week: 0.0 standard drinks     Comment: A few times per year   • Drug use: No   • Sexual activity: Yes     Partners: Female     Birth control/protection: None     Comment:          Breastfeeding No       PHYSICAL EXAM  Video Visit  Physical Exam   Constitutional: She is oriented to person, place, and time. She appears well-developed and well-nourished. She does not have a sickly appearance. She appears ill. No distress. She appears overweight.  HENT:   Head: Normocephalic and atraumatic.   Right Ear: Hearing normal. No drainage.   Left Ear: Hearing normal. No drainage.   Nose: Congestion present.   Mouth/Throat: Mouth/Lips are normal.  Eyes: Conjunctivae and EOM are normal.   Pulmonary/Chest: Effort normal. No stridor.  No respiratory distress. She no audible wheeze...  Neurological: She is alert and oriented to person, place, and time.   Skin: Skin is dry.   Psychiatric: She has a normal mood and affect.           Diagnoses and all orders for this visit:    1. Suspected COVID-19 virus infection (Primary)  -     COVID-19,[facility] ROUTINE, NP/OP SWAB IN TRANSPORT MEDIA OR ESWAB 72 HR TAT - Swab, Anterior nasal; Future  -     QUESTIONNAIRE SERIES      Please go to your nearest [facility] for COVID-19 testing. When you arrive, let them know you have an order for testing. We will call you with the results from a BLOCKED NUMBER, usually within 1-3 days.   While you are waiting for your results, you should Self-Quarantine. Treat mild symptoms as you would with any cold or viral illness. Alternate Tylenol and Motrin for pain and/or fever, stay hydrated and rest. If symptoms worsen or do not improve, follow up with your PCP or visit your nearest Urgent Care Center or ER. Patient verbalized understanding.     FOLLOW-UP  As discussed during visit with PCP/[facility] if no improvement or Urgent Care/Emergency Department if worsening of symptoms    Patient verbalizes understanding of medication dosage, comfort measures, instructions for treatment and follow-up.    Antonella Armstrong, APRN  02/11/2022  05:15 EST    This visit was performed via Telehealth.  This patient has been instructed to follow-up with their primary care provider if their symptoms worsen or the treatment provided does not resolve their illness.    The use of a video visit has been reviewed with the patient and verbal informed consent has been obtained. Myself and Roseanna Charles    participated in this visit. The patient is located in [place], [place]. I am located in [place], [place]. Mychart and Zoom were utilized. I spent 19  minutes in the patient's chart for this visit.

## 2022-02-19 ENCOUNTER — TELEMEDICINE (OUTPATIENT)
Dept: FAMILY MEDICINE CLINIC | Facility: TELEHEALTH | Age: 42
End: 2022-02-19

## 2022-02-19 ENCOUNTER — E-VISIT (OUTPATIENT)
Dept: FAMILY MEDICINE CLINIC | Facility: TELEHEALTH | Age: 42
End: 2022-02-19

## 2022-02-19 DIAGNOSIS — K52.9 GASTROENTERITIS: Primary | ICD-10-CM

## 2022-02-19 PROCEDURE — 99213 OFFICE O/P EST LOW 20 MIN: CPT | Performed by: NURSE PRACTITIONER

## 2022-02-19 RX ORDER — ONDANSETRON 4 MG/1
4 TABLET, FILM COATED ORAL EVERY 8 HOURS PRN
Qty: 20 TABLET | Refills: 0 | Status: SHIPPED | OUTPATIENT
Start: 2022-02-19 | End: 2022-03-01

## 2022-02-19 NOTE — PATIENT INSTRUCTIONS
If symptoms do not improve in the next 2-3 days and you are unable to tolerate fluids or you develop signs of dehydration, go to ED for IV fluids. If you develop worsening muscle cramps, chest pain or shortness of breath, go to the ED for evaluation immediately.      Viral Gastroenteritis, Adult    Viral gastroenteritis is also known as the stomach flu. This condition may affect your stomach, small intestine, and large intestine. It can cause sudden watery diarrhea, fever, and vomiting. This condition is caused by many different viruses. These viruses can be passed from person to person very easily (are contagious).  Diarrhea and vomiting can make you feel weak and cause you to become dehydrated. You may not be able to keep fluids down. Dehydration can make you tired and thirsty, cause you to have a dry mouth, and decrease how often you urinate. It is important to replace the fluids that you lose from diarrhea and vomiting.  What are the causes?  Gastroenteritis is caused by many viruses, including rotavirus and norovirus. Norovirus is the most common cause in adults. You can get sick after being exposed to the viruses from other people. You can also get sick by:  · Eating food, drinking water, or touching a surface contaminated with one of these viruses.  · Sharing utensils or other personal items with an infected person.  What increases the risk?  You are more likely to develop this condition if you:  · Have a weak body defense system (immune system).  · Live with one or more children who are younger than 2 years old.  · Live in a nursing home.  · Travel on cruise ships.  What are the signs or symptoms?  Symptoms of this condition start suddenly 1-3 days after exposure to a virus. Symptoms may last for a few days or for as long as a week. Common symptoms include watery diarrhea and vomiting. Other symptoms include:  · Fever.  · Headache.  · Fatigue.  · Pain in the  abdomen.  · Chills.  · Weakness.  · Nausea.  · Muscle aches.  · Loss of appetite.  How is this diagnosed?  This condition is diagnosed with a medical history and physical exam. You may also have a stool test to check for viruses or other infections.  How is this treated?  This condition typically goes away on its own. The focus of treatment is to prevent dehydration and restore lost fluids (rehydration). This condition may be treated with:  · An oral rehydration solution (ORS) to replace important salts and minerals (electrolytes) in your body. Take this if told by your health care provider. This is a drink that is sold at pharmacies and retail stores.  · Medicines to help with your symptoms.  · Probiotic supplements to reduce symptoms of diarrhea.  · Fluids given through an IV, if dehydration is severe.  Older adults and people with other diseases or a weak immune system are at higher risk for dehydration.  Follow these instructions at home:    Eating and drinking    · Take an ORS as told by your health care provider.  · Drink clear fluids in small amounts as you are able. Clear fluids include:  ? Water.  ? Ice chips.  ? Diluted fruit juice.  ? Low-calorie sports drinks.  · Drink enough fluid to keep your urine pale yellow.  · Eat small amounts of healthy foods every 3-4 hours as you are able. This may include whole grains, fruits, vegetables, lean meats, and yogurt.  · Avoid fluids that contain a lot of sugar or caffeine, such as energy drinks, sports drinks, and soda.  · Avoid spicy or fatty foods.  · Avoid alcohol.    General instructions  · Wash your hands often, especially after having diarrhea or vomiting. If soap and water are not available, use hand .  · Make sure that all people in your household wash their hands well and often.  · Take over-the-counter and prescription medicines only as told by your health care provider.  · Rest at home while you recover.  · Watch your condition for any  changes.  · Take a warm bath to relieve any burning or pain from frequent diarrhea episodes.  · Keep all follow-up visits as told by your health care provider. This is important.  Contact a health care provider if you:  · Cannot keep fluids down.  · Have symptoms that get worse.  · Have new symptoms.  · Feel light-headed or dizzy.  · Have muscle cramps.  Get help right away if you:  · Have chest pain.  · Feel extremely weak or you faint.  · See blood in your vomit.  · Have vomit that looks like coffee grounds.  · Have bloody or black stools or stools that look like tar.  · Have a severe headache, a stiff neck, or both.  · Have a rash.  · Have severe pain, cramping, or bloating in your abdomen.  · Have trouble breathing or you are breathing very quickly.  · Have a fast heartbeat.  · Have skin that feels cold and clammy.  · Feel confused.  · Have pain when you urinate.  · Have signs of dehydration, such as:  ? Dark urine, very little urine, or no urine.  ? Cracked lips.  ? Dry mouth.  ? Sunken eyes.  ? Sleepiness.  ? Weakness.  Summary  · Viral gastroenteritis is also known as the stomach flu. It can cause sudden watery diarrhea, fever, and vomiting.  · This condition can be passed from person to person very easily (is contagious).  · Take an ORS if told by your health care provider. This is a drink that is sold at pharmacies and retail stores.  · Wash your hands often, especially after having diarrhea or vomiting. If soap and water are not available, use hand .  This information is not intended to replace advice given to you by your health care provider. Make sure you discuss any questions you have with your health care provider.  Document Revised: 06/05/2020 Document Reviewed: 10/23/2019  [employer]vier Patient Education © 2021 [employer]    Dehydration, Adult  Dehydration is condition in which there is not enough water or other fluids in the body. This happens when a person loses more fluids than he or she  takes in. Important body parts cannot work right without the right amount of fluids. Any loss of fluids from the body can cause dehydration.  Dehydration can be mild, worse, or very bad. It should be treated right away to keep it from getting very bad.  What are the causes?  This condition may be caused by:  · Conditions that cause loss of water or other fluids, such as:  ? Watery poop (diarrhea).  ? Vomiting.  ? Sweating a lot.  ? Peeing (urinating) a lot.  · Not drinking enough fluids, especially when you:  ? Are ill.  ? Are doing things that take a lot of energy to do.  · Other illnesses and conditions, such as fever or infection.  · Certain medicines, such as medicines that take extra fluid out of the body (diuretics).  · Lack of safe drinking water.  · Not being able to get enough water and food.  What increases the risk?  The following factors may make you more likely to develop this condition:  · Having a long-term (chronic) illness that has not been treated the right way, such as:  ? Diabetes.  ? Heart disease.  ? Kidney disease.  · Being 65 years of age or older.  · Having a disability.  · Living in a place that is high above the ground or sea (high in altitude). The thinner, dried air causes more fluid loss.  · Doing exercises that put stress on your body for a long time.  What are the signs or symptoms?  Symptoms of dehydration depend on how bad it is.  Mild or worse dehydration  · Thirst.  · Dry lips or dry mouth.  · Feeling dizzy or light-headed, especially when you stand up from sitting.  · Muscle cramps.  · Your body making:  ? Dark pee (urine). Pee may be the color of tea.  ? Less pee than normal.  ? Less tears than normal.  · Headache.  Very bad dehydration  · Changes in skin. Skin may:  ? Be cold to the touch (clammy).  ? Be blotchy or pale.  ? Not go back to normal right after you lightly pinch it and let it go.  · Little or no tears, pee, or sweat.  · Changes in vital signs, such as:  ? Fast  breathing.  ? Low blood pressure.  ? Weak pulse.  ? Pulse that is more than 100 beats a minute when you are sitting still.  · Other changes, such as:  ? Feeling very thirsty.  ? Eyes that look hollow (sunken).  ? Cold hands and feet.  ? Being mixed up (confused).  ? Being very tired (lethargic) or having trouble waking from sleep.  ? Short-term weight loss.  ? Loss of consciousness.  How is this treated?  Treatment for this condition depends on how bad it is. Treatment should start right away. Do not wait until your condition gets very bad. Very bad dehydration is an emergency. You will need to go to a hospital.  · Mild or worse dehydration can be treated at home. You may be asked to:  ? Drink more fluids.  ? Drink an oral rehydration solution (ORS). This drink helps get the right amounts of fluids and salts and minerals in the blood (electrolytes).  · Very bad dehydration can be treated:  ? With fluids through an IV tube.  ? By getting normal levels of salts and minerals in your blood. This is often done by giving salts and minerals through a tube. The tube is passed through your nose and into your stomach.  ? By treating the root cause.  Follow these instructions at home:  Oral rehydration solution  If told by your doctor, drink an ORS:  · Make an ORS. Use instructions on the package.  · Start by drinking small amounts, about ½ cup (120 mL) every 5-10 minutes.  · Slowly drink more until you have had the amount that your doctor said to have.  Eating and drinking              · Drink enough clear fluid to keep your pee pale yellow. If you were told to drink an ORS, finish the ORS first. Then, start slowly drinking other clear fluids. Drink fluids such as:  ? Water. Do not drink only water. Doing that can make the salt (sodium) level in your body get too low.  ? Water from ice chips you suck on.  ? Fruit juice that you have added water to (diluted).  ? Low-calorie sports drinks.  · Eat foods that have the right  amounts of salts and minerals, such as:  ? Bananas.  ? Oranges.  ? Potatoes.  ? Tomatoes.  ? Spinach.  · Do not drink alcohol.  · Avoid:  ? Drinks that have a lot of sugar. These include:  § High-calorie sports drinks.  § Fruit juice that you did not add water to.  § Soda.  § Caffeine.  ? Foods that are greasy or have a lot of fat or sugar.  General instructions  · Take over-the-counter and prescription medicines only as told by your doctor.  · Do not take salt tablets. Doing that can make the salt level in your body get too high.  · Return to your normal activities as told by your doctor. Ask your doctor what activities are safe for you.  · Keep all follow-up visits as told by your doctor. This is important.  Contact a doctor if:  · You have pain in your belly (abdomen) and the pain:  ? Gets worse.  ? Stays in one place.  · You have a rash.  · You have a stiff neck.  · You get angry or annoyed (irritable) more easily than normal.  · You are more tired or have a harder time waking than normal.  · You feel:  ? Weak or dizzy.  ? Very thirsty.  Get help right away if you have:  · Any symptoms of very bad dehydration.  · Symptoms of vomiting, such as:  ? You cannot eat or drink without vomiting.  ? Your vomiting gets worse or does not go away.  ? Your vomit has blood or green stuff in it.  · Symptoms that get worse with treatment.  · A fever.  · A very bad headache.  · Problems with peeing or pooping (having a bowel movement), such as:  ? Watery poop that gets worse or does not go away.  ? Blood in your poop (stool). This may cause poop to look black and tarry.  ? Not peeing in 6-8 hours.  ? Peeing only a small amount of very dark pee in 6-8 hours.  · Trouble breathing.  These symptoms may be an emergency. Do not wait to see if the symptoms will go away. Get medical help right away. Call your local emergency services (911 in the [place] Do not drive yourself to the hospital.  Summary  · Dehydration is a condition in  which there is not enough water or other fluids in the body. This happens when a person loses more fluids than he or she takes in.  · Treatment for this condition depends on how bad it is. Treatment should be started right away. Do not wait until your condition gets very bad.  · Drink enough clear fluid to keep your pee pale yellow. If you were told to drink an oral rehydration solution (ORS), finish the ORS first. Then, start slowly drinking other clear fluids.  · Take over-the-counter and prescription medicines only as told by your doctor.  · Get help right away if you have any symptoms of very bad dehydration.  This information is not intended to replace advice given to you by your health care provider. Make sure you discuss any questions you have with your health care provider.  Document Revised: 07/30/2020 Document Reviewed: 07/30/2020  Elsevier Patient Education © 2021 [employer]

## 2022-02-19 NOTE — PROGRESS NOTES
"Mode of Visit: Video  Location of patient: home  You have chosen to receive care through a telehealth visit.  The patient has signed the video visit consent form.  The visit included audio and video interaction. No technical issues occurred during this visit.   Video Visit Reason:   Free Text Description: Nausea, diarrhea, headache, hurting everywhere. I was covid tested on 2/11/22. It was negative.  Chief Complaint  Nausea, Diarrhea, and Headache    Subjective          Roseanna Charles presents to [facility]  Nausea, diarrhea, headache and muscles aches in chest and abdominal cramping for a couple of days with fatigue. She had what she thought was sinus infection last week since her Covid was negative on 2/11/2022. She has had nasal congestion and using nasacort and sinus medication with relief of those symptoms. A few days now of headache, nausea without vomiting and diarrhea, 4-5 times a day for the last 2 days. She is drinking water to stay hydrated but just feels \"like she was hit by a truck\". She has a history of Migraine and has had sinus headaches but the headache is not like those is more dull and continuous.    Nausea  This is a new problem. The current episode started in the past 7 days. The problem occurs intermittently. The problem has been unchanged. Associated symptoms include abdominal pain, a change in bowel habit, headaches and nausea. Pertinent negatives include no chills, congestion, coughing, fever, sore throat, visual change or vomiting.   Diarrhea   The current episode started in the past 7 days. The problem occurs 2 to 4 times per day. The problem has been unchanged. Associated symptoms include abdominal pain and headaches. Pertinent negatives include no chills, coughing, fever or vomiting.   Headache   The current episode started in the past 7 days. The problem occurs constantly. The quality of the pain is described as dull. The pain is at a severity of 5/10. " Associated symptoms include abdominal pain and nausea. Pertinent negatives include no coughing, fever, loss of balance, phonophobia, photophobia, rhinorrhea, sinus pressure, sore throat, visual change or vomiting. Nothing aggravates the symptoms. She has tried acetaminophen for the symptoms. The treatment provided no relief. Her past medical history is significant for migraine headaches.     Objective   Vital Signs:   There were no vitals taken for this visit.    Physical Exam   Constitutional: She appears well-developed and well-nourished. No distress.   Pulmonary/Chest: Effort normal.   Neurological: She is alert.   Psychiatric: She has a normal mood and affect.     Result Review :                 Assessment and Plan    Diagnoses and all orders for this visit:    1. Gastroenteritis (Primary)  -     ondansetron (Zofran) 4 MG tablet; Take 1 tablet by mouth Every 8 (Eight) Hours As Needed for Nausea or Vomiting for up to 10 days.  Dispense: 20 tablet; Refill: 0    If symptoms do not improve in the next 2-3 days and you are unable to tolerate fluids or you develop signs of dehydration, go to ED for IV fluids.           I spent 20 minutes caring for Roseanna on this date of service. This time includes time spent by me in the following activities:preparing for the visit, obtaining and/or reviewing a separately obtained history, performing a medically appropriate examination and/or evaluation , counseling and educating the patient/family/caregiver, ordering medications, tests, or procedures, and documenting information in the medical record  Follow Up   Return if symptoms worsen or fail to improve.  Patient was given instructions and counseling regarding her condition or for health maintenance advice. Please see specific information pulled into the AVS if appropriate.

## 2022-02-19 NOTE — E-VISIT ESCALATED
Patient escalated   Provider Antonella Armstrong chose to escalate patient to another level of care because: Diagnosis not available   Patient was sent the following message:   Based on the information you've provided us, you need to take another step to get care.   What to do now:    Please set up a video visit  .   You won't be charged for your eVisit. If you paid with a credit card, the charge will be reversed.   Chief Complaint: Coronavirus (COVID-19), cold, sinus pain, allergy, or flu   Patient introduction   Patient is 41-year-old female who reports headache, fatigue, nausea or vomiting, and diarrhea that started < 48 hours ago.   Patient has not requested COVID testing.   COVID-19 exposure, testing history, and vaccination status:    No known exposure to a confirmed or suspected case of COVID-19.    No recent travel outside of their local community.    Patient had a viral lab test on 02/11/2022. Test result was negative.    Reports receiving 2 doses of the COVID-19 vaccine (Pfizer, Pfizer). Received their most recent dose of the vaccine > 14 days ago.   Risk factors for severe disease from COVID-19 infection:    BMI >= 25    Diabetes   When asked why they're seeking care online today, patient reports they need a doctor's note, just want to feel better, and would like to mention the following reason in their own words: I think I may have the stomach flu. My stomach is cramping.   Patient-submitted comments:   Patient writes: I just feel like I have been ran over..   Patient requests a 2-day excuse note.   General presentation   Symptoms came on suddenly.   Fever:    Denies fever.   Sinus and nasal symptoms:    Denies nasal or sinus congestion.    Denies rhinitis.    Denies itchy nose or sneezing.   Sore throat:    Denies sore throat.   Head and body aches:    Reports headache, described as moderate (4-6 on a scale of 1-10).    Reports fatigue.    Denies sweats.    Denies chills.    Denies myalgia.   Dizziness:     Denies dizziness.   Cough:    Denies cough.   Wheezing and SOB:    Reports previous albuterol inhaler use during URIs, bronchitis, or pneumonia.    Reports previous steroid inhaler use during URIs, bronchitis, or pneumonia.    Not using an albuterol inhaler for current symptoms because they don't feel they need it.    Denies COPD diagnosis.    Denies asthma diagnosis.    Denies wheezing.    Denies shortness of breath.   Chest pain:    Denies chest pain.   Allergies:    Reports history of allergies.    Patient does not think symptoms are allergy-related.    Patient has known seasonal allergies and known dust allergies.   Flu exposure:    Denies recent exposure to confirmed flu diagnosis.    Denies receiving a flu vaccine this season.   Patient denies the following red flags:    Changes in alertness or awareness    Symptoms suggesting intracranial hemorrhage    Decreased urination   Risk factors for antibiotic resistance:    Diabetes   Pregnancy/menstrual status/breastfeeding:    Denies being pregnant    Denies breastfeeding    Regarding last menstrual period, patient writes: 2 weeks ago   Self-exam:    No difficulty moving their chin toward their chest    Neck lymph nodes feel normal    Denies antibiotic treatment for similar symptoms within the past month   Current medications   Denies taking over-the-counter medication for current symptoms.   Denies taking other medications or supplements.   Medication allergies   None.   Medication contraindication review   Reports history positive for depression. Therefore, the following medication(s) will not be prescribed:    Metoclopramide   Denies history of metoclopramide-associated dystonic reaction and tardive dyskinesia.   No known history of amoxicillin-clavulanate-associated cholestatic jaundice or hepatic impairment.   No known history of azithromycin-associated cholestatic jaundice or hepatic impairment.   Past medical history   Immune conditions: Denies  immunocompromising conditions. Denies history of cancer.   Social history   Non-smoker.   Assessment:   Patient determined to need a level of care not appropriate to be delivered through eVisit.   Plan:   Patient informed of need to seek in-person care      ----------   Electronically signed by MAYURI Jarvis on 2022-02-19 at 02:57AM   ----------   Patient Interview Transcript:   Why are you getting care through eVisit today? We can't guarantee a specific treatment or test. Your provider will decide what's best for you. Select all that apply.    I need a doctor's note    I just want to feel better!    Other (specify on next screen)   Not selected:    I want to know if I have a cold or something more serious    I want to know if I need to be seen by a provider    I want to be tested for COVID-19    I want to get the COVID-19 vaccine    I think I'm having side effects from the COVID-19 vaccine   Tell us why you're getting care through eVisit today.    I think I may have the stomach flu. My stomach is cramping.   COVID-19 symptoms are similar to symptoms of other illnesses. This makes it difficult to diagnose. Please carefully consider each question and answer as best you can. This will help your provider make the right diagnosis. Which of these symptoms are bothering you? Select all that apply.    Headache    Fatigue or tiredness    Nausea or vomiting    Diarrhea   Not selected:    Cough    Shortness of breath    Fever    Stuffed-up nose or sinuses    Runny nose    Itchy or watery eyes    Itchy nose or sneezing    Loss of smell or taste    Sore throat    Hoarse voice or loss of voice    Sweats    Chills    Muscle or body aches    I don't have any of these symptoms   Do you have any of these conditions? These conditions can put you at increased risk for severe disease if you're infected with COVID-19. Select all that apply.    None of the above   Not selected:    Chronic lung disease, such as cystic fibrosis or  interstitial fibrosis    Heart disease, such as congenital heart disease, congestive heart failure, or coronary artery disease    Disorder of the brain, spinal cord, or nerves and muscles, such as dementia, cerebral palsy, epilepsy, muscular dystrophy, or developmental delay    Metabolic disorder or mitochondrial disease    Cerebrovascular disease, such as stroke or another condition affecting the blood vessels or blood supply to the brain    Down syndrome    Mood disorder, including depression or schizophrenia spectrum disorders    Substance use disorder, such as alcohol, opioid, or cocaine use disorder    Tuberculosis   Do you live in a group care setting? Examples include: - Nursing home - Residential care - Psychiatric treatment facility - Group home - [facility] - [facility] and care home - Homeless shelter - Foster care setting Select one.    No   Not selected:    Yes   Have you ever been tested for COVID-19? Select one.    Yes   Not selected:    No   When was your most recent COVID-19 test? Select one.    Within the last week (specify date in MM/DD/YYYY format): 02/11/2022   Not selected:    7 to 14 days ago    15 to 30 days ago    1 to 3 months ago    More than 3 months ago   What type of COVID-19 test did you most recently have? There are two types of COVID-19 tests: - Viral tests check if you're currently infected with COVID-19. For these tests, a nose swab or saliva sample is taken. Viral tests include self-tests and tests done at a doctor's office, lab, or testing site. - Antibody tests check if you've been infected in the past. For these tests, your blood is drawn. Antibody tests can only be done at a doctor's office, lab, or testing site. Select one.    Viral test at a doctor's office, lab, or testing site   Not selected:    Viral self-test    Antibody test   What was the result of your most recent COVID-19 test? Select one.    Negative   Not selected:    Positive    I'm not sure   Have you gotten the  "COVID-19 vaccine? Select one.    Yes   Not selected:    No   How many doses of the COVID-19 vaccine have you gotten? This includes boosters. Select one.    2 doses   Not selected:    1 dose    3 doses   Which COVID-19 vaccine did you get for your first dose? Check your Vaccination Record Card under Product Name/. Select one.    Pfizer-BioNTech (Pfizer)   Not selected:    Max & Max's Julianna Vaccine (J&J/Julianna)    Moderna   Which COVID-19 vaccine did you get for your second dose? Check your Vaccination Record Card under Product Name/. Select one.    Pfizer-BioNTech (Pfizer)   Not selected:    Max & Max's Julianna Vaccine (J&J/Julianna)    Moderna   When did you get your most recent dose of the COVID-19 vaccine?    More than 14 days ago   Not selected:    Less than 48 hours (2 days) ago    48 to 72 hours (3 days) ago    3 to 5 days ago    5 to 7 days ago    7 to 14 days ago   In the last 14 days, have you traveled outside of your local community? This includes travel by car, RV, bus, train, or plane. Travel increases your chances of getting and spreading COVID-19. Select one.    No   Not selected:    Yes   In the last 14 days, have you had close contact with someone who has coronavirus (COVID-19)? \"Close contact\" means any of these: - Living in the same household as someone with COVID-19. - Caring for someone with COVID-19. - Being within 6 feet of someone with COVID-19 for a total of at least 15 minutes over a 24-hour period. For example, three 5-minute exposures for a total of 15 minutes. - Being in direct contact with respiratory droplets from someone with COVID-19 (being coughed on, kissing, sharing utensils). Select one.    No, not that I know of   Not selected:    Yes, a confirmed case    Yes, a suspected case   When did your current symptoms start? If you know the exact date your symptoms started, choose Other and enter the month and day. Select one.    Less than 48 hours " ago   Not selected:    3 to 5 days ago    6 to 9 days ago    10 to 14 days ago    2 to 4 weeks ago    More than a month ago    Other (specify)   Did your symptoms come on suddenly or gradually? Select one.    Suddenly   Not selected:    Gradually    I'm not sure   You mentioned having a headache. On a scale of 1 to 10, how severe is your headache pain? Select one.    Moderate (4 to 6)   Not selected:    Mild (1 to 3)    Severe (7 to 9)    Unbearable (10)    The worst headache of my life (10+)   Since your symptoms started, have you felt dizzy? Select one.    No   Not selected:    Yes, but I can continue with my regular daily activities    Yes, and it makes it hard to stand, walk, or do daily activities   Do you have chest pain? You might also feel it as discomfort, aching, tightness, or squeezing in the chest. Select one.    No   Not selected:    Yes   Have you urinated at least 3 times in the last 24 hours? Select one.    Yes   Not selected:    No    I'm not sure   Changes in alertness or awareness may mean you need emergency care. Since your symptoms started, have you had any of these? Select all that apply.    None of the above   Not selected:    Confusion    Slurred speech    Not knowing where you are or what day it is    Difficulty staying conscious    Fainting or passing out   Do your symptoms include a whistling sound, or wheezing, when you breathe? Select one.    No   Not selected:    Yes    I'm not sure   Do you have any of these symptoms in your ear(s)? Select all that apply.    None of the above   Not selected:    Pain    Pressure    Fullness    Crackling or popping    Plugged or blocked sensation   Can you move your chin toward your chest?    Yes   Not selected:    No, my neck is too stiff   Are your glands/lymph nodes swollen, or does it hurt when you touch them?    No   Not selected:    Yes    I'm not sure   People with a very high body mass index (BMI) are at higher risk for developing complications  from the flu and severe illness from COVID-19. To determine your BMI, we need to know your weight and height. Please enter your weight (in pounds).    Weight   Please enter your height.    Height   In the past week, has anyone around you (such as at school, work, or home) had a confirmed diagnosis of the flu? A confirmed diagnosis means that a nose swab was done to verify a flu infection. Select all that apply.    No   Not selected:    I live with someone who has the flu    I've been within touching distance of someone who has the flu    I've walked by, or sat about 3 feet away from, someone who has the flu    I've been in the same building as someone who has the flu    I'm not sure   Have you ever been diagnosed with asthma? Select one.    No   Not selected:    Yes   Have you ever been prescribed albuterol to use for wheezing, cough, or shortness of breath caused by a cold, bronchitis, or pneumonia? Albuterol (ProAir, Proventil, Ventolin) is prescribed as an inhaler or a solution to be used with a nebulizer machine. Select one.    Yes   Not selected:    No    I'm not sure   Have you ever been prescribed a steroid inhaler to use for wheezing, cough, or shortness of breath caused by a cold, bronchitis, or pneumonia? Some examples of steroid inhalers include Pulmicort, Flovent, Qvar, and Alvesco. Select one.    Yes   Not selected:    No    I'm not sure   Have you used albuterol for your current symptoms? This includes both albuterol inhalers and albuterol solution in a nebulizer machine. Select one.    No, I don't feel like I need it   Not selected:    Yes    No, I don't have any, or it's    Would you like a prescription for albuterol? Select one.    No   Not selected:    Yes   Have you ever been diagnosed with chronic obstructive pulmonary disease (COPD)? Select one.    No   Not selected:    Yes    I'm not sure   Do you have allergies (pollen, dust mites, mold, animal dander)? Select one.    Yes   Not  selected:    No    I'm not sure   What kind of allergies do you have? Select all that apply.    Seasonal allergies (hay fever)    Dust allergies   Not selected:    Pet allergies    None of the above    I'm not sure   Do you think your symptoms could be allergy-related? Select one.    No   Not selected:    Yes    I'm not sure   Have you had a flu shot this season? Select one.    No   Not selected:    Yes, less than 2 weeks ago    Yes, 2 to 4 weeks ago    Yes, 1 to 3 months ago    Yes, 3 to 6 months ago    Yes, more than 6 months ago    I'm not sure   The flu and COVID-19 can be more serious for people with certain conditions or characteristics. These questions help us figure out if you or anyone you live with is at higher risk for complications from these infections. Do either of these statements apply to you? Select all that apply.    None of the above   Not selected:    I'm  or Native [place]    I'm a healthcare worker   Do you smoke tobacco? Select one.    No, never   Not selected:    Yes, every day    Yes, some days    No, I quit   Some conditions can put you at risk for more serious infections. Do any of these apply to you? Select all that apply.    I have diabetes   Not selected:    I've been hospitalized within the last 5 days    I'm in close contact with a child in     None of the above   Are you currently being treated for any of these conditions? Scroll to see all options. Select all that apply.    Depression   Not selected:    Aspirin triad (also known as Samter's triad or ASA triad)    Asthma or hives from taking aspirin or other NSAIDs, such as ibuprofen or naproxen    Blockage or narrowing of the blood vessels of the heart    Blood dyscrasia, such anemia, leukemia, lymphoma, or myeloma    Bone marrow depression    Catecholamine-releasing paraganglioma    Blood clotting disorder    Congenital long QT syndrome    Difficulty urinating or completely emptying your bladder    Uncorrected  electrolyte abnormalities    Fungal infection    Gastrointestinal (GI) bleeding    Gastrointestinal (GI) obstruction    G6PD deficiency    Recent heart attack    High blood pressure    Irregular heartbeat or heart rhythm    Kidney disease or hemodialysis    Mononucleosis (mono)    Myasthenia gravis    Parkinson's disease    Pheochromocytoma    Reye syndrome    Seizure disorder    Ulcerative colitis    None of the above   Do you have any of these conditions that can affect the immune system? Scroll to see all options. Select all that apply.    None of these   Not selected:    History of bone marrow transplant    Chronic kidney disease    Chronic liver disease (including cirrhosis)    HIV/AIDS    Inflammatory bowel disease (Crohn's disease or ulcerative colitis)    Lupus    Moderate to severe plaque psoriasis    Multiple sclerosis    Rheumatoid arthritis    Sickle cell anemia    Alpha or beta thalassemia    History of solid organ transplant (kidney, liver, or heart)    History of spleen removal    An autoimmune disorder not listed here    A condition requiring treatment with long-term use of oral steroids (such as prednisone, prednisolone, or dexamethasone)   Have you ever been diagnosed with cancer? Select one.    No   Not selected:    Yes, I have cancer now    Yes, but I'm in remission   Have you ever had either of these conditions? Select all that apply.    No   Not selected:    Metoclopramide-associated dystonic reaction    Tardive dyskinesia   Do any of these apply to the people who live with you? Select all that apply.    None of the above   Not selected:    A child under the age of 5    An adult 65 or older    A person who is pregnant    A person who has given birth, had a miscarriage, had a pregnancy loss, or had an  in the last 2 weeks    An  or Native [place]   Does any member of your household have any of these medical conditions? Select all that apply.    None of the above   Not  selected:    Asthma    Disorders of the brain, spinal cord, or nerves and muscles, such as dementia, cerebral palsy, epilepsy, muscular dystrophy, or developmental delay    Chronic lung disease, such as COPD or cystic fibrosis    Heart disease, such as congenital heart disease, congestive heart failure, or coronary artery disease    Cerebrovascular disease, such as stroke or another condition affecting the blood vessels or blood supply to the brain    Blood disorders, such as sickle cell disease    Diabetes    Metabolic disorders such as inherited metabolic disorders or mitochondrial disease    Kidney disorders    Liver disorders    Weakened immune system due to illness or medications such as chemotherapy or steroids    Children under the age of 19 who are on long-term aspirin therapy    Extreme obesity (BMI > 40)   Are you pregnant? Select one.    No   Not selected:    Yes   When was your last menstrual period? If you don't currently have periods or no longer have periods, please briefly explain.    2 weeks ago   Within the last 2 weeks, have you: - Given birth - Had a miscarriage - Had a pregnancy loss - Had an  Being postpartum (live birth or loss) within the last 2 weeks increases your risk of flu complications. Select one.    No   Not selected:    Yes   Are you breastfeeding? Select one.    No   Not selected:    Yes   Just a few more questions about medications, and then you're finished. Have you used any non-prescription medications or nasal sprays for your current symptoms? Examples include saline sprays, decongestants, NyQuil, and Tylenol. Select one.    No   Not selected:    Yes   In the past month, have you taken antibiotics for similar symptoms? Examples of antibiotics include amoxicillin, amoxicillin-clavulanate (Augmentin), penicillin, cefdinir (Omnicef), doxycycline, and clindamycin (Cleocin). Select one.    No   Not selected:    Yes    I'm not sure   Have you taken any monoamine oxidase  inhibitor (MAOI) medications in the last 14 days? Examples include rasagiline (Azilect), selegiline (Eldepryl, Zelapar), isocarboxazid (Marplan), phenelzine (Nardil), and tranylcypromine (Parnate). Select one.    No   Not selected:    Yes    I'm not sure   Do you take Kynmobi or Apokyn (apomorphine)? Select one.    No   Not selected:    Yes    I'm not sure   Are you taking any other medications or supplements? On the next screen, you need to list all vitamins, supplements, non-prescription medications (such as aspirin or Aleve), and prescription medications that you're taking. Select one.    No   Not selected:    Yes    Yes, but I'm not sure what they are   Have you ever had an allergic or bad reaction to any medication? Select one.    No   Not selected:    Yes   Are you allergic to milk or to the proteins found in milk (for example, whey or casein)? A milk allergy is different from lactose intolerance. Select one.    No   Not selected:    Yes    I'm not sure   Have you ever had jaundice or liver problems as a result of taking amoxicillin-clavulanate (Augmentin)? Jaundice is a condition in which the skin and the whites of the eyes turn yellow. Select all that apply.    No, not that I know of   Not selected:    Yes, jaundice    Yes, liver problems   Have you ever had jaundice or liver problems as a result of taking azithromycin (Zithromax, Zmax)? Jaundice is a condition in which the skin and the whites of the eyes turn yellow. Select all that apply.    No, not that I know of   Not selected:    Yes, jaundice    Yes, liver problems   Do you need a doctor's note? A doctor's note confirms that you received care today and states when you can return to school or work. It does not contain information about your diagnosis or treatment plan. Your provider will make the final decision on whether to give you a doctor's note and for how long. Doctor's notes CANNOT be backdated. We can't provide medical leave paperwork through  this type of visit. If more paperwork is needed to request time off, contact your primary care provider. Select one.    Today and tomorrow (2 days)   Not selected:    Today only (1 day)    3 days    7 days    10 days    14 days    No   Is there anything else you'd like to tell us about your symptoms?    I just feel like I have been ran over.   ----------   Medical history   Medical history data does not currently exist for this patient.

## 2022-08-06 DIAGNOSIS — F31.62 BIPOLAR DISORDER, CURRENT EPISODE MIXED, MODERATE: ICD-10-CM

## 2022-08-06 DIAGNOSIS — Z79.4 TYPE 2 DIABETES MELLITUS WITHOUT COMPLICATION, WITH LONG-TERM CURRENT USE OF INSULIN: ICD-10-CM

## 2022-08-06 DIAGNOSIS — E11.9 TYPE 2 DIABETES MELLITUS WITHOUT COMPLICATION, WITH LONG-TERM CURRENT USE OF INSULIN: ICD-10-CM

## 2022-08-06 DIAGNOSIS — G43.719 INTRACTABLE CHRONIC MIGRAINE WITHOUT AURA AND WITHOUT STATUS MIGRAINOSUS: ICD-10-CM

## 2022-08-08 RX ORDER — QUETIAPINE FUMARATE 50 MG/1
TABLET, FILM COATED ORAL
Qty: 15 TABLET | Refills: 0 | OUTPATIENT
Start: 2022-08-08

## 2022-08-08 RX ORDER — TOPIRAMATE 50 MG/1
TABLET, FILM COATED ORAL
Qty: 30 TABLET | Refills: 0 | OUTPATIENT
Start: 2022-08-08

## 2024-07-31 ENCOUNTER — HOSPITAL ENCOUNTER (OUTPATIENT)
Dept: CT IMAGING | Facility: HOSPITAL | Age: 44
Discharge: HOME | End: 2024-07-31
Payer: MEDICAID

## 2024-07-31 DIAGNOSIS — C64.1 RENAL CARCINOMA, RIGHT: ICD-10-CM

## 2024-07-31 LAB
CREAT BLDA-MCNC: 0.8 MG/DL (ref 0.6–1.3)
EGFRCR SERPLBLD CKD-EPI 2021: 95.1 ML/MIN/1.73

## 2024-07-31 PROCEDURE — 71260 CT THORAX DX C+: CPT

## 2024-07-31 PROCEDURE — 82565 ASSAY OF CREATININE: CPT

## 2024-07-31 PROCEDURE — 74177 CT ABD & PELVIS W/CONTRAST: CPT

## 2024-07-31 PROCEDURE — 25510000001 IOPAMIDOL PER 1 ML: Performed by: UROLOGY

## 2024-07-31 RX ADMIN — IOPAMIDOL 100 ML: 755 INJECTION, SOLUTION INTRAVENOUS at 10:12

## 2024-08-19 ENCOUNTER — TRANSCRIBE ORDERS (OUTPATIENT)
Dept: ADMINISTRATIVE | Facility: HOSPITAL | Age: 44
End: 2024-08-19
Payer: MEDICAID

## 2024-08-19 DIAGNOSIS — N28.9 DISORDER OF KIDNEY AND URETER: Primary | ICD-10-CM

## 2024-08-26 ENCOUNTER — TELEMEDICINE (OUTPATIENT)
Dept: FAMILY MEDICINE CLINIC | Facility: TELEHEALTH | Age: 44
End: 2024-08-26
Payer: MEDICAID

## 2024-08-26 VITALS — BODY MASS INDEX: 33.97 KG/M2 | WEIGHT: 199 LBS | HEIGHT: 64 IN

## 2024-08-26 DIAGNOSIS — R11.0 NAUSEA: Primary | ICD-10-CM

## 2024-08-26 DIAGNOSIS — B34.9 VIRAL ILLNESS: ICD-10-CM

## 2024-08-26 PROBLEM — C64.9: Status: ACTIVE | Noted: 2024-03-30

## 2024-08-26 RX ORDER — BUSPIRONE HYDROCHLORIDE 7.5 MG/1
7.5 TABLET ORAL 2 TIMES DAILY PRN
COMMUNITY
Start: 2024-07-16

## 2024-08-26 RX ORDER — PANTOPRAZOLE SODIUM 40 MG/1
1 TABLET, DELAYED RELEASE ORAL DAILY
COMMUNITY
Start: 2024-06-29

## 2024-08-26 RX ORDER — ATORVASTATIN CALCIUM 20 MG/1
20 TABLET, FILM COATED ORAL
COMMUNITY
Start: 2024-06-29

## 2024-08-26 NOTE — PROGRESS NOTES
You have chosen to receive care through a telehealth visit.  Do you consent to use a video/audio connection for your medical care today? Yes     HPI  Roesanna Charles is a 43 y.o. female  presents with complaint of nausea, body aches. Additional symptoms that she is having are noted in the ROS portion of this visit. The only exposure that she is aware of is her daughter. Her daughter did do an in person visit and did test negative for COVID. Mom reports that she was diagnosed with a cold. The patient has had her symptoms for 3-4 days now. She has taken ondansetron for her symptoms without relief from the nausea. She does not really think that another nausea medication would help her. She has also taken Mucinex for her symptoms.    Review of Systems   Constitutional:  Positive for fatigue. Negative for fever. Appetite change: decreased.  HENT:  Positive for congestion. Negative for sore throat.    Gastrointestinal:  Positive for nausea. Negative for abdominal distention, abdominal pain, diarrhea and vomiting.   Musculoskeletal:  Positive for myalgias.   Neurological:  Positive for headaches.       Past Medical History:   Diagnosis Date    Abdominal pain     Impression: This pain is likely due to BV found on her pelvic exam. She is being treated with Flagyl at this time.    Acute non-recurrent maxillary sinusitis     Impression: She was prescribed Cipro to treat her symptoms. Increase fluids. Tylenol/motrin for pain or fever. Medication and medication adverse effects discussed. Follow-up 5-7 days for reevaluation if not improved or sooner if needed.    Allergic     Since childhood    Anemia     Impression: Improved.    Anxiety and depression     Impression: Stable at this time. No changes to her medications.    Bacterial vaginosis     Impression: Due to her complaints of abdominal pain being so low in the suprapubic region she had a pelvic exam which caused pain and discomfort. Her wet prep was positive for clue  cells. She was started on Flagyl.    Chicken pox     Diabetes     Impression: Her bs was 108. A1C will need to be ordered at her next clinic visit.    Flu-like symptoms     Impression: Due to exposure and symptoms she was sent Tamiflu and given a Depo/Dexa IM inj. Increase fluids. Tylenol/motrin for pain or fever. Medication and medication adverse effects discussed. Follow-up 5-7 days for reevaluation if not improved or sooner if needed.    History of ulcer disease     Hypothyroid     Impression: Her TSH was elevated despite having a normal T3 and T4. Her Levothyroxin was increased to decrease her TSH. She will need to followup in 6 wks for a repeat Thyroid panel.    Knee pain, right     Impression: X-Ray clear. MRI-ordered. She was advised to consider a joint injection.    Malaise and fatigue     Impression: Unknonwn etiology of symptoms. May be secondary of DM or Hypothyroidism. Blood work was obtained to determine a cause.    Migraine without aura and without status migrainosus, not intractable     Impression: Worsened. She reports that her HA has not improved. She was advsied to consider restarting Topamax.    Overweight (BMI 25.0-29.9)     Pneumonia 12/1/21    Was diagnosed in [facility]    Upper respiratory infection, acute     Impression: She was prescribed Cipro to treat her symptoms. Increase fluids. Tylenol/motrin for pain or fever. Medication and medication adverse effects discussed. Follow-up 5-7 days for reevaluation if not improved or sooner if needed.       Family History   Problem Relation Age of Onset    Hyperlipidemia Mother     Diabetes Mother         Diabetes Mellitus    Heart disease Mother     Coronary artery disease Father     Hypertension Father     Diabetes Father         Diabetes Mellitus    Heart disease Father     COPD Son     Asthma Son     Coronary artery disease Paternal Grandmother     Hypertension Paternal Grandmother     Diabetes Paternal Grandmother         Diabetes  "Mellitus    COPD Paternal Grandmother     Asthma Paternal Grandmother     Cancer Paternal Grandmother         Colon    Heart disease Paternal Grandmother     COPD Maternal Grandmother             Developmental Disability Brother        Social History     Socioeconomic History    Marital status:    Tobacco Use    Smoking status: Never    Smokeless tobacco: Never   Vaping Use    Vaping status: Never Used   Substance and Sexual Activity    Alcohol use: Yes     Alcohol/week: 0.0 standard drinks of alcohol     Comment: A few times per year    Drug use: No    Sexual activity: Yes     Partners: Female     Birth control/protection: None     Comment:        Roseanna Charles  reports that she has never smoked. She has never used smokeless tobacco. She does not dip      Ht 162.6 cm (64\")   Wt 90.3 kg (199 lb)   Breastfeeding No   BMI 34.16 kg/m²     PHYSICAL EXAM  Physical Exam   Constitutional: She is oriented to person, place, and time. She appears well-developed.   HENT:   Head: Normocephalic and atraumatic.   Nose: Congestion present.   Eyes: Lids are normal. Right eye exhibits no discharge. Left eye exhibits no discharge. Right conjunctiva is not injected. Left conjunctiva is not injected.   Pulmonary/Chest:  No respiratory distress.  Abdominal: There is no abdominal tenderness.   Neurological: She is alert and oriented to person, place, and time. No cranial nerve deficit.   Psychiatric: She has a normal mood and affect. Her speech is normal and behavior is normal. Judgment and thought content normal.       Results for orders placed or performed during the hospital encounter of 24   POC Creatinine    Specimen: Venous Blood   Result Value Ref Range    Creatinine 0.80 0.60 - 1.30 mg/dL    eGFR 95.1 >60.0 mL/min/1.73       Diagnoses and all orders for this visit:    1. Nausea (Primary)    2. Viral illness    Hydrate well. Water, Pedialyte and Gatorade are best.  [place] diet    FOLLOW-UP  If " symptoms worsen or persist follow up with PCP, Virtual Care or Urgent Care    Patient verbalizes understanding of medication dosage, comfort measures, instructions for treatment and follow-up.    Nya Robles, APRN  08/26/2024  11:54 EDT    The use of a video visit has been reviewed with the patient and verbal informed consent has been obtained. Myself and Roseanna Charles participated in this visit. The patient is located in [address] [place] [address] [place] [facility].    I am located in [place], [place]. [employer] and [employer] were utilized. I spent 24 minutes in the patient's chart for this visit.

## 2024-08-26 NOTE — LETTER
August 26, 2024       No Recipients    Patient: Roseanna Charles   YOB: 1980   Date of Visit: 8/26/2024     To whom it may concern:     Roseanna Charles was evaluated by me and may return to work on 08/27/2024.    Sincerely,        MAYURI Greene        CC:   No Recipients

## 2024-11-04 ENCOUNTER — HOSPITAL ENCOUNTER (OUTPATIENT)
Dept: CT IMAGING | Facility: HOSPITAL | Age: 44
Discharge: HOME | End: 2024-11-04
Admitting: UROLOGY
Payer: MEDICAID

## 2024-11-04 DIAGNOSIS — N28.9 DISORDER OF KIDNEY AND URETER: ICD-10-CM

## 2024-11-04 LAB
CREAT BLDA-MCNC: 0.8 MG/DL (ref 0.6–1.3)
EGFRCR SERPLBLD CKD-EPI 2021: 94.5 ML/MIN/1.73

## 2024-11-04 PROCEDURE — 82565 ASSAY OF CREATININE: CPT

## 2024-11-04 PROCEDURE — 71260 CT THORAX DX C+: CPT

## 2024-11-04 PROCEDURE — 25510000001 IOPAMIDOL PER 1 ML: Performed by: UROLOGY

## 2024-11-04 RX ORDER — IOPAMIDOL 755 MG/ML
100 INJECTION, SOLUTION INTRAVASCULAR
Status: COMPLETED | OUTPATIENT
Start: 2024-11-04 | End: 2024-11-04

## 2024-11-04 RX ADMIN — IOPAMIDOL 100 ML: 755 INJECTION, SOLUTION INTRAVENOUS at 09:30

## 2025-01-27 ENCOUNTER — TRANSCRIBE ORDERS (OUTPATIENT)
Dept: ADMINISTRATIVE | Facility: HOSPITAL | Age: 45
End: 2025-01-27
Payer: MEDICAID

## 2025-01-27 DIAGNOSIS — C64.1: Primary | ICD-10-CM

## 2025-06-03 ENCOUNTER — TRANSCRIBE ORDERS (OUTPATIENT)
Dept: ADMINISTRATIVE | Facility: HOSPITAL | Age: 45
End: 2025-06-03
Payer: MEDICAID

## 2025-06-03 DIAGNOSIS — R91.1 PULMONARY NODULE: Primary | ICD-10-CM

## 2025-06-16 ENCOUNTER — HOSPITAL ENCOUNTER (OUTPATIENT)
Dept: CT IMAGING | Facility: HOSPITAL | Age: 45
Discharge: HOME | End: 2025-06-16
Payer: OTHER GOVERNMENT

## 2025-06-16 DIAGNOSIS — R91.1 PULMONARY NODULE: ICD-10-CM

## 2025-06-16 PROCEDURE — 71250 CT THORAX DX C-: CPT

## 2025-07-14 ENCOUNTER — HOSPITAL ENCOUNTER (OUTPATIENT)
Dept: CT IMAGING | Facility: HOSPITAL | Age: 45
Discharge: HOME | End: 2025-07-14
Admitting: UROLOGY
Payer: OTHER GOVERNMENT

## 2025-07-14 DIAGNOSIS — C64.1: ICD-10-CM

## 2025-07-14 LAB
CREAT BLDA-MCNC: 0.8 MG/DL (ref 0.6–1.3)
EGFRCR SERPLBLD CKD-EPI 2021: 94.5 ML/MIN/1.73

## 2025-07-14 PROCEDURE — 25510000001 IOPAMIDOL PER 1 ML: Performed by: UROLOGY

## 2025-07-14 PROCEDURE — 74178 CT ABD&PLV WO CNTR FLWD CNTR: CPT

## 2025-07-14 PROCEDURE — 82565 ASSAY OF CREATININE: CPT

## 2025-07-14 RX ORDER — IOPAMIDOL 755 MG/ML
100 INJECTION, SOLUTION INTRAVASCULAR
Status: COMPLETED | OUTPATIENT
Start: 2025-07-14 | End: 2025-07-14

## 2025-07-14 RX ADMIN — IOPAMIDOL 100 ML: 755 INJECTION, SOLUTION INTRAVENOUS at 10:10
